# Patient Record
Sex: FEMALE | Race: BLACK OR AFRICAN AMERICAN | NOT HISPANIC OR LATINO | Employment: FULL TIME | URBAN - NONMETROPOLITAN AREA
[De-identification: names, ages, dates, MRNs, and addresses within clinical notes are randomized per-mention and may not be internally consistent; named-entity substitution may affect disease eponyms.]

---

## 2017-01-25 ENCOUNTER — TRANSCRIBE ORDERS (OUTPATIENT)
Dept: CARDIAC SURGERY | Facility: CLINIC | Age: 37
End: 2017-01-25

## 2017-01-25 DIAGNOSIS — I10 ESSENTIAL HYPERTENSION, MALIGNANT: Primary | ICD-10-CM

## 2017-03-09 RX ORDER — METHIMAZOLE 5 MG/1
5 TABLET ORAL DAILY
Qty: 30 TABLET | Refills: 0 | Status: SHIPPED | OUTPATIENT
Start: 2017-03-09 | End: 2017-05-31 | Stop reason: SDUPTHER

## 2017-05-19 DIAGNOSIS — F41.9 ANXIETY: ICD-10-CM

## 2017-05-19 DIAGNOSIS — F32.A DEPRESSIVE DISORDER: ICD-10-CM

## 2017-05-19 RX ORDER — ESCITALOPRAM OXALATE 10 MG/1
TABLET ORAL
Qty: 30 TABLET | Refills: 0 | Status: SHIPPED | OUTPATIENT
Start: 2017-05-19 | End: 2017-05-31 | Stop reason: SDUPTHER

## 2017-05-31 ENCOUNTER — OFFICE VISIT (OUTPATIENT)
Dept: FAMILY MEDICINE CLINIC | Facility: CLINIC | Age: 37
End: 2017-05-31

## 2017-05-31 VITALS
SYSTOLIC BLOOD PRESSURE: 116 MMHG | WEIGHT: 233.4 LBS | HEART RATE: 92 BPM | OXYGEN SATURATION: 96 % | DIASTOLIC BLOOD PRESSURE: 70 MMHG | TEMPERATURE: 98.4 F | RESPIRATION RATE: 20 BRPM | HEIGHT: 67 IN | BODY MASS INDEX: 36.63 KG/M2

## 2017-05-31 DIAGNOSIS — Z13.1 SCREENING FOR DIABETES MELLITUS: ICD-10-CM

## 2017-05-31 DIAGNOSIS — F41.9 ANXIETY: ICD-10-CM

## 2017-05-31 DIAGNOSIS — E05.90 HYPERTHYROIDISM: Chronic | ICD-10-CM

## 2017-05-31 DIAGNOSIS — R12 HEARTBURN: ICD-10-CM

## 2017-05-31 DIAGNOSIS — E55.9 VITAMIN D DEFICIENCY: Chronic | ICD-10-CM

## 2017-05-31 DIAGNOSIS — F32.A DEPRESSIVE DISORDER: ICD-10-CM

## 2017-05-31 DIAGNOSIS — I10 ESSENTIAL HYPERTENSION: Primary | Chronic | ICD-10-CM

## 2017-05-31 DIAGNOSIS — B37.89 CANDIDIASIS OF BREAST: ICD-10-CM

## 2017-05-31 PROCEDURE — 99214 OFFICE O/P EST MOD 30 MIN: CPT | Performed by: NURSE PRACTITIONER

## 2017-05-31 RX ORDER — TRIAMTERENE AND HYDROCHLOROTHIAZIDE 75; 50 MG/1; MG/1
1 TABLET ORAL DAILY
Qty: 30 TABLET | Refills: 5 | Status: SHIPPED | OUTPATIENT
Start: 2017-05-31 | End: 2018-06-20 | Stop reason: SDUPTHER

## 2017-05-31 RX ORDER — METHIMAZOLE 5 MG/1
5 TABLET ORAL DAILY
Qty: 30 TABLET | Refills: 5 | Status: SHIPPED | OUTPATIENT
Start: 2017-05-31 | End: 2018-06-20 | Stop reason: SDUPTHER

## 2017-05-31 RX ORDER — ERGOCALCIFEROL 1.25 MG/1
50000 CAPSULE ORAL
Qty: 12 CAPSULE | Refills: 3 | Status: SHIPPED | OUTPATIENT
Start: 2017-05-31 | End: 2017-07-18 | Stop reason: ALTCHOICE

## 2017-05-31 RX ORDER — OMEPRAZOLE 20 MG/1
20 CAPSULE, DELAYED RELEASE ORAL DAILY
Qty: 30 CAPSULE | Refills: 5 | Status: SHIPPED | OUTPATIENT
Start: 2017-05-31 | End: 2018-06-20 | Stop reason: SDUPTHER

## 2017-05-31 RX ORDER — NYSTATIN 100000 [USP'U]/G
POWDER TOPICAL 3 TIMES DAILY
Qty: 56.7 G | Refills: 5 | Status: SHIPPED | OUTPATIENT
Start: 2017-05-31 | End: 2018-06-20

## 2017-05-31 RX ORDER — FERROUS SULFATE TAB EC 324 MG (65 MG FE EQUIVALENT) 324 (65 FE) MG
324 TABLET DELAYED RESPONSE ORAL
Qty: 30 TABLET | Refills: 5 | Status: SHIPPED | OUTPATIENT
Start: 2017-05-31 | End: 2018-06-20 | Stop reason: SDDI

## 2017-05-31 RX ORDER — ESCITALOPRAM OXALATE 10 MG/1
10 TABLET ORAL DAILY
Qty: 30 TABLET | Refills: 5 | Status: SHIPPED | OUTPATIENT
Start: 2017-05-31 | End: 2018-06-20 | Stop reason: DRUGHIGH

## 2017-07-17 LAB
25(OH)D3 SERPL-MCNC: 28.5 NG/ML (ref 30–100)
ARTICHOKE IGE QN: 98 MG/DL (ref 1–129)
CHOLEST SERPL-MCNC: 157 MG/DL (ref 0–199)
HBA1C MFR BLD: 6.24 % (ref 4–5.6)
HDLC SERPL-MCNC: 43 MG/DL (ref 60–200)
LDLC/HDLC SERPL: 1.94 {RATIO} (ref 0–3.22)
T4 FREE SERPL-MCNC: 0.73 NG/DL (ref 0.78–2.19)
TRIGL SERPL-MCNC: 152 MG/DL (ref 20–199)
TSH SERPL DL<=0.05 MIU/L-ACNC: 1.14 MIU/ML (ref 0.46–4.68)

## 2017-07-17 PROCEDURE — 36415 COLL VENOUS BLD VENIPUNCTURE: CPT | Performed by: NURSE PRACTITIONER

## 2017-07-17 PROCEDURE — 82306 VITAMIN D 25 HYDROXY: CPT | Performed by: NURSE PRACTITIONER

## 2017-07-17 PROCEDURE — 85027 COMPLETE CBC AUTOMATED: CPT | Performed by: NURSE PRACTITIONER

## 2017-07-17 PROCEDURE — 84439 ASSAY OF FREE THYROXINE: CPT | Performed by: NURSE PRACTITIONER

## 2017-07-17 PROCEDURE — 84443 ASSAY THYROID STIM HORMONE: CPT | Performed by: NURSE PRACTITIONER

## 2017-07-17 PROCEDURE — 80061 LIPID PANEL: CPT | Performed by: INTERNAL MEDICINE

## 2017-07-17 PROCEDURE — 84481 FREE ASSAY (FT-3): CPT | Performed by: NURSE PRACTITIONER

## 2017-07-17 PROCEDURE — 83036 HEMOGLOBIN GLYCOSYLATED A1C: CPT | Performed by: NURSE PRACTITIONER

## 2017-07-17 PROCEDURE — 80053 COMPREHEN METABOLIC PANEL: CPT | Performed by: INTERNAL MEDICINE

## 2017-07-18 ENCOUNTER — TELEPHONE (OUTPATIENT)
Dept: FAMILY MEDICINE CLINIC | Facility: CLINIC | Age: 37
End: 2017-07-18

## 2017-07-18 DIAGNOSIS — R73.09 ELEVATED HEMOGLOBIN A1C: Primary | ICD-10-CM

## 2017-07-18 LAB
DEPRECATED RDW RBC AUTO: 44.9 FL (ref 36.4–46.3)
ERYTHROCYTE [DISTWIDTH] IN BLOOD BY AUTOMATED COUNT: 14.3 % (ref 11.5–14.5)
HCT VFR BLD AUTO: 37.6 % (ref 35–45)
HGB BLD-MCNC: 12.1 G/DL (ref 12–15.5)
MCH RBC QN AUTO: 27.8 PG (ref 26.5–34)
MCHC RBC AUTO-ENTMCNC: 32.2 G/DL (ref 31.4–36)
MCV RBC AUTO: 86.4 FL (ref 80–98)
PLATELET # BLD AUTO: 206 10*3/MM3 (ref 150–450)
PMV BLD AUTO: 12.4 FL (ref 8–12)
RBC # BLD AUTO: 4.35 10*6/MM3 (ref 3.77–5.16)
T3FREE SERPL-MCNC: 2.6 PG/ML (ref 2–4.4)
WBC NRBC COR # BLD: 5.92 10*3/MM3 (ref 3.2–9.8)

## 2017-07-18 PROCEDURE — 82043 UR ALBUMIN QUANTITATIVE: CPT | Performed by: INTERNAL MEDICINE

## 2017-07-18 PROCEDURE — 82570 ASSAY OF URINE CREATININE: CPT | Performed by: INTERNAL MEDICINE

## 2017-07-18 RX ORDER — METFORMIN HYDROCHLORIDE 500 MG/1
500 TABLET, EXTENDED RELEASE ORAL
Qty: 30 TABLET | Refills: 5 | Status: SHIPPED | OUTPATIENT
Start: 2017-07-18 | End: 2018-06-20 | Stop reason: SDDI

## 2017-07-18 NOTE — TELEPHONE ENCOUNTER
Insurance likely will not cover the daily vitamin D.  Advise her to take vitamin D3 OTC daily instead.  I will send the metformin in.  Thank you!

## 2017-07-18 NOTE — TELEPHONE ENCOUNTER
Spoke with patient and she would like to start the metformin.  She wanted to know if you could send in a prescription for a daily Vitamin D instead of the weekly because she forgets to take it.

## 2017-07-18 NOTE — TELEPHONE ENCOUNTER
----- Message from ROSSY Matthews sent at 7/18/2017  8:20 AM CDT -----  Vitamin D is still a little low.  Has she been taking her vitamin D supplement?  A1C continues to be elevated at 6.24.  Is she interested in metformin to help manage her blood sugars and lower her A1C?

## 2017-07-19 LAB
ALBUMIN UR-MCNC: <0.6 MG/L
CREAT UR-MCNC: 242.4 MG/DL
MICROALBUMIN/CREAT UR: NORMAL MG/G (ref 0–30)

## 2018-02-07 DIAGNOSIS — R73.09 ELEVATED HEMOGLOBIN A1C: ICD-10-CM

## 2018-02-07 RX ORDER — METFORMIN HYDROCHLORIDE 500 MG/1
TABLET, EXTENDED RELEASE ORAL
Qty: 30 TABLET | Refills: 5 | OUTPATIENT
Start: 2018-02-07

## 2018-05-29 DIAGNOSIS — E05.90 HYPERTHYROIDISM: Chronic | ICD-10-CM

## 2018-05-29 RX ORDER — METHIMAZOLE 5 MG/1
TABLET ORAL
Qty: 30 TABLET | Refills: 5 | OUTPATIENT
Start: 2018-05-29

## 2018-05-31 DIAGNOSIS — I10 ESSENTIAL HYPERTENSION: Chronic | ICD-10-CM

## 2018-05-31 RX ORDER — TRIAMTERENE AND HYDROCHLOROTHIAZIDE 75; 50 MG/1; MG/1
TABLET ORAL
Qty: 30 TABLET | Refills: 5 | OUTPATIENT
Start: 2018-05-31

## 2018-06-20 ENCOUNTER — OFFICE VISIT (OUTPATIENT)
Dept: FAMILY MEDICINE CLINIC | Facility: CLINIC | Age: 38
End: 2018-06-20

## 2018-06-20 VITALS
OXYGEN SATURATION: 97 % | RESPIRATION RATE: 20 BRPM | WEIGHT: 259.6 LBS | DIASTOLIC BLOOD PRESSURE: 80 MMHG | TEMPERATURE: 98.3 F | HEIGHT: 68 IN | SYSTOLIC BLOOD PRESSURE: 130 MMHG | HEART RATE: 90 BPM | BODY MASS INDEX: 39.34 KG/M2

## 2018-06-20 DIAGNOSIS — Z13.220 SCREENING FOR LIPOID DISORDERS: ICD-10-CM

## 2018-06-20 DIAGNOSIS — I10 ESSENTIAL HYPERTENSION: Chronic | ICD-10-CM

## 2018-06-20 DIAGNOSIS — R12 HEARTBURN: ICD-10-CM

## 2018-06-20 DIAGNOSIS — E05.90 HYPERTHYROIDISM: Primary | Chronic | ICD-10-CM

## 2018-06-20 DIAGNOSIS — M25.562 ACUTE PAIN OF LEFT KNEE: ICD-10-CM

## 2018-06-20 DIAGNOSIS — E16.1 HYPERINSULINISM: ICD-10-CM

## 2018-06-20 DIAGNOSIS — F41.9 ANXIETY: ICD-10-CM

## 2018-06-20 DIAGNOSIS — Z12.31 ENCOUNTER FOR SCREENING MAMMOGRAM FOR BREAST CANCER: ICD-10-CM

## 2018-06-20 DIAGNOSIS — Z13.1 SCREENING FOR DIABETES MELLITUS: ICD-10-CM

## 2018-06-20 DIAGNOSIS — E55.9 VITAMIN D DEFICIENCY: Chronic | ICD-10-CM

## 2018-06-20 DIAGNOSIS — R07.2 PRECORDIAL PAIN: ICD-10-CM

## 2018-06-20 PROCEDURE — 99214 OFFICE O/P EST MOD 30 MIN: CPT | Performed by: NURSE PRACTITIONER

## 2018-06-20 RX ORDER — CLOBETASOL PROPIONATE 0.5 MG/G
OINTMENT TOPICAL EVERY 12 HOURS SCHEDULED
Qty: 60 G | Refills: 5 | Status: SHIPPED | OUTPATIENT
Start: 2018-06-20 | End: 2018-06-29 | Stop reason: CLARIF

## 2018-06-20 RX ORDER — TRIAMTERENE AND HYDROCHLOROTHIAZIDE 75; 50 MG/1; MG/1
1 TABLET ORAL DAILY
Qty: 30 TABLET | Refills: 5 | Status: SHIPPED | OUTPATIENT
Start: 2018-06-20 | End: 2018-10-26 | Stop reason: SDUPTHER

## 2018-06-20 RX ORDER — METHIMAZOLE 5 MG/1
5 TABLET ORAL DAILY
Qty: 30 TABLET | Refills: 0 | Status: SHIPPED | OUTPATIENT
Start: 2018-06-20 | End: 2018-07-28 | Stop reason: SDUPTHER

## 2018-06-20 RX ORDER — NAPROXEN 500 MG/1
500 TABLET ORAL 2 TIMES DAILY WITH MEALS
Qty: 60 TABLET | Refills: 0 | Status: SHIPPED | OUTPATIENT
Start: 2018-06-20 | End: 2018-07-23 | Stop reason: SDUPTHER

## 2018-06-20 RX ORDER — ESCITALOPRAM OXALATE 20 MG/1
20 TABLET ORAL DAILY
Qty: 30 TABLET | Refills: 5 | Status: SHIPPED | OUTPATIENT
Start: 2018-06-20 | End: 2018-10-26 | Stop reason: SDUPTHER

## 2018-06-20 RX ORDER — OMEPRAZOLE 20 MG/1
20 CAPSULE, DELAYED RELEASE ORAL DAILY
Qty: 30 CAPSULE | Refills: 5 | Status: SHIPPED | OUTPATIENT
Start: 2018-06-20 | End: 2018-10-26 | Stop reason: SDUPTHER

## 2018-06-20 NOTE — PROGRESS NOTES
She has some fluid on the left knee as I stated in the office, but there are also bone spurs present.  Would she like a referral to ortho to discuss this and possible treatment options?

## 2018-06-20 NOTE — PATIENT INSTRUCTIONS
Preventive Care 18-39 Years, Female  Preventive care refers to lifestyle choices and visits with your health care provider that can promote health and wellness.  What does preventive care include?  · A yearly physical exam. This is also called an annual well check.  · Dental exams once or twice a year.  · Routine eye exams. Ask your health care provider how often you should have your eyes checked.  · Personal lifestyle choices, including:  ? Daily care of your teeth and gums.  ? Regular physical activity.  ? Eating a healthy diet.  ? Avoiding tobacco and drug use.  ? Limiting alcohol use.  ? Practicing safe sex.  ? Taking vitamin and mineral supplements as recommended by your health care provider.  What happens during an annual well check?  The services and screenings done by your health care provider during your annual well check will depend on your age, overall health, lifestyle risk factors, and family history of disease.  Counseling  Your health care provider may ask you questions about your:  · Alcohol use.  · Tobacco use.  · Drug use.  · Emotional well-being.  · Home and relationship well-being.  · Sexual activity.  · Eating habits.  · Work and work environment.  · Method of birth control.  · Menstrual cycle.  · Pregnancy history.    Screening  You may have the following tests or measurements:  · Height, weight, and BMI.  · Diabetes screening. This is done by checking your blood sugar (glucose) after you have not eaten for a while (fasting).  · Blood pressure.  · Lipid and cholesterol levels. These may be checked every 5 years starting at age 20.  · Skin check.  · Hepatitis C blood test.  · Hepatitis B blood test.  · Sexually transmitted disease (STD) testing.  · BRCA-related cancer screening. This may be done if you have a family history of breast, ovarian, tubal, or peritoneal cancers.  · Pelvic exam and Pap test. This may be done every 3 years starting at age 21. Starting at age 30, this may be done every 5  years if you have a Pap test in combination with an HPV test.    Discuss your test results, treatment options, and if necessary, the need for more tests with your health care provider.  Vaccines  Your health care provider may recommend certain vaccines, such as:  · Influenza vaccine. This is recommended every year.  · Tetanus, diphtheria, and acellular pertussis (Tdap, Td) vaccine. You may need a Td booster every 10 years.  · Varicella vaccine. You may need this if you have not been vaccinated.  · HPV vaccine. If you are 26 or younger, you may need three doses over 6 months.  · Measles, mumps, and rubella (MMR) vaccine. You may need at least one dose of MMR. You may also need a second dose.  · Pneumococcal 13-valent conjugate (PCV13) vaccine. You may need this if you have certain conditions and were not previously vaccinated.  · Pneumococcal polysaccharide (PPSV23) vaccine. You may need one or two doses if you smoke cigarettes or if you have certain conditions.  · Meningococcal vaccine. One dose is recommended if you are age 19-21 years and a first-year college student living in a residence [place], or if you have one of several medical conditions. You may also need additional booster doses.  · Hepatitis A vaccine. You may need this if you have certain conditions or if you travel or work in places where you may be exposed to hepatitis A.  · Hepatitis B vaccine. You may need this if you have certain conditions or if you travel or work in places where you may be exposed to hepatitis B.  · Haemophilus influenzae type b (Hib) vaccine. You may need this if you have certain risk factors.    Talk to your health care provider about which screenings and vaccines you need and how often you need them.  This information is not intended to replace advice given to you by your health care provider. Make sure you discuss any questions you have with your health care provider.  Document Released: 02/13/2003 Document Revised: 09/06/2017  Document Reviewed: 10/18/2016  [employer] Patient Education © 2018 [employer]    Preventive Care 40-64 Years, Female  Preventive care refers to lifestyle choices and visits with your health care provider that can promote health and wellness.  What does preventive care include?  · A yearly physical exam. This is also called an annual well check.  · Dental exams once or twice a year.  · Routine eye exams. Ask your health care provider how often you should have your eyes checked.  · Personal lifestyle choices, including:  ? Daily care of your teeth and gums.  ? Regular physical activity.  ? Eating a healthy diet.  ? Avoiding tobacco and drug use.  ? Limiting alcohol use.  ? Practicing safe sex.  ? Taking low-dose aspirin daily starting at age 50.  ? Taking vitamin and mineral supplements as recommended by your health care provider.  What happens during an annual well check?  The services and screenings done by your health care provider during your annual well check will depend on your age, overall health, lifestyle risk factors, and family history of disease.  Counseling  Your health care provider may ask you questions about your:  · Alcohol use.  · Tobacco use.  · Drug use.  · Emotional well-being.  · Home and relationship well-being.  · Sexual activity.  · Eating habits.  · Work and work environment.  · Method of birth control.  · Menstrual cycle.  · Pregnancy history.    Screening  You may have the following tests or measurements:  · Height, weight, and BMI.  · Blood pressure.  · Lipid and cholesterol levels. These may be checked every 5 years, or more frequently if you are over 50 years old.  · Skin check.  · Lung cancer screening. You may have this screening every year starting at age 55 if you have a 30-pack-year history of smoking and currently smoke or have quit within the past 15 years.  · Fecal occult blood test (FOBT) of the stool. You may have this test every year starting at age  50.  · Flexible sigmoidoscopy or colonoscopy. You may have a sigmoidoscopy every 5 years or a colonoscopy every 10 years starting at age 50.  · Hepatitis C blood test.  · Hepatitis B blood test.  · Sexually transmitted disease (STD) testing.  · Diabetes screening. This is done by checking your blood sugar (glucose) after you have not eaten for a while (fasting). You may have this done every 1-3 years.  · Mammogram. This may be done every 1-2 years. Talk to your health care provider about when you should start having regular mammograms. This may depend on whether you have a family history of breast cancer.  · BRCA-related cancer screening. This may be done if you have a family history of breast, ovarian, tubal, or peritoneal cancers.  · Pelvic exam and Pap test. This may be done every 3 years starting at age 21. Starting at age 30, this may be done every 5 years if you have a Pap test in combination with an HPV test.  · Bone density scan. This is done to screen for osteoporosis. You may have this scan if you are at high risk for osteoporosis.    Discuss your test results, treatment options, and if necessary, the need for more tests with your health care provider.  Vaccines  Your health care provider may recommend certain vaccines, such as:  · Influenza vaccine. This is recommended every year.  · Tetanus, diphtheria, and acellular pertussis (Tdap, Td) vaccine. You may need a Td booster every 10 years.  · Varicella vaccine. You may need this if you have not been vaccinated.  · Zoster vaccine. You may need this after age 60.  · Measles, mumps, and rubella (MMR) vaccine. You may need at least one dose of MMR if you were born in 1957 or later. You may also need a second dose.  · Pneumococcal 13-valent conjugate (PCV13) vaccine. You may need this if you have certain conditions and were not previously vaccinated.  · Pneumococcal polysaccharide (PPSV23) vaccine. You may need one or two doses if you smoke cigarettes or if you  have certain conditions.  · Meningococcal vaccine. You may need this if you have certain conditions.  · Hepatitis A vaccine. You may need this if you have certain conditions or if you travel or work in places where you may be exposed to hepatitis A.  · Hepatitis B vaccine. You may need this if you have certain conditions or if you travel or work in places where you may be exposed to hepatitis B.  · Haemophilus influenzae type b (Hib) vaccine. You may need this if you have certain conditions.    Talk to your health care provider about which screenings and vaccines you need and how often you need them.  This information is not intended to replace advice given to you by your health care provider. Make sure you discuss any questions you have with your health care provider.  Document Released: 01/13/2017 Document Revised: 09/06/2017 Document Reviewed: 10/18/2016  [employer] Interactive Patient Education © 2018 [employer]    Generalized Anxiety Disorder, Adult  Generalized anxiety disorder (GREGORIA) is a mental health disorder. People with this condition constantly worry about everyday events. Unlike normal anxiety, worry related to GREGORIA is not triggered by a specific event. These worries also do not fade or get better with time. GREGORIA interferes with life functions, including relationships, work, and school.  GREGORIA can vary from mild to severe. People with severe GREGORIA can have intense waves of anxiety with physical symptoms (panic attacks).  What are the causes?  The exact cause of GREGORIA is not known.  What increases the risk?  This condition is more likely to develop in:  · Women.  · People who have a family history of anxiety disorders.  · People who are very shy.  · People who experience very stressful life events, such as the death of a loved one.  · People who have a very stressful family environment.    What are the signs or symptoms?  People with GREGORIA often worry excessively about many things in their lives, such as their  health and family. They may also be overly concerned about:  · Doing well at work.  · Being on time.  · Natural disasters.  · Friendships.    Physical symptoms of GREGORIA include:  · Fatigue.  · Muscle tension or having muscle twitches.  · Trembling or feeling shaky.  · Being easily startled.  · Feeling like your heart is pounding or racing.  · Feeling out of breath or like you cannot take a deep breath.  · Having trouble falling asleep or staying asleep.  · Sweating.  · Nausea, diarrhea, or irritable bowel syndrome (IBS).  · Headaches.  · Trouble concentrating or remembering facts.  · Restlessness.  · Irritability.    How is this diagnosed?  Your health care provider can diagnose GREGORIA based on your symptoms and medical history. You will also have a physical exam. The health care provider will ask specific questions about your symptoms, including how severe they are, when they started, and if they come and go. Your health care provider may ask you about your use of alcohol or drugs, including prescription medicines. Your health care provider may refer you to a mental health specialist for further evaluation.  Your health care provider will do a thorough examination and may perform additional tests to rule out other possible causes of your symptoms.  To be diagnosed with GREGORIA, a person must have anxiety that:  · Is out of his or her control.  · Affects several different aspects of his or her life, such as work and relationships.  · Causes distress that makes him or her unable to take part in normal activities.  · Includes at least three physical symptoms of GREGORIA, such as restlessness, fatigue, trouble concentrating, irritability, muscle tension, or sleep problems.    Before your health care provider can confirm a diagnosis of GREGORIA, these symptoms must be present more days than they are not, and they must last for six months or longer.  How is this treated?  The following therapies are usually used to treat GREGORIA:  · Medicine.  Antidepressant medicine is usually prescribed for long-term daily control. Antianxiety medicines may be added in severe cases, especially when panic attacks occur.  · Talk therapy (psychotherapy). Certain types of talk therapy can be helpful in treating GREGORIA by providing support, education, and guidance. Options include:  ? Cognitive behavioral therapy (CBT). People learn coping skills and techniques to ease their anxiety. They learn to identify unrealistic or negative thoughts and behaviors and to replace them with positive ones.  ? Acceptance and commitment therapy (ACT). This treatment teaches people how to be mindful as a way to cope with unwanted thoughts and feelings.  ? Biofeedback. This process trains you to manage your body's response (physiological response) through breathing techniques and relaxation methods. You will work with a therapist while machines are used to monitor your physical symptoms.  · Stress management techniques. These include yoga, meditation, and exercise.    A mental health specialist can help determine which treatment is best for you. Some people see improvement with one type of therapy. However, other people require a combination of therapies.  Follow these instructions at home:  · Take over-the-counter and prescription medicines only as told by your health care provider.  · Try to maintain a normal routine.  · Try to anticipate stressful situations and allow extra time to manage them.  · Practice any stress management or self-calming techniques as taught by your health care provider.  · Do not punish yourself for setbacks or for not making progress.  · Try to recognize your accomplishments, even if they are small.  · Keep all follow-up visits as told by your health care provider. This is important.  Contact a health care provider if:  · Your symptoms do not get better.  · Your symptoms get worse.  · You have signs of depression, such as:  ? A persistently sad, cranky, or irritable  mood.  ? Loss of enjoyment in activities that used to bring you anna.  ? Change in weight or eating.  ? Changes in sleeping habits.  ? Avoiding friends or family members.  ? Loss of energy for normal tasks.  ? Feelings of guilt or worthlessness.  Get help right away if:  · You have serious thoughts about hurting yourself or others.  If you ever feel like you may hurt yourself or others, or have thoughts about taking your own life, get help right away. You can go to your nearest emergency department or call:  · Your local emergency services (911 in the [place]  · A suicide crisis helpline, such as the National Suicide Prevention Lifeline at 1-311.154.6847. This is open 24 hours a day.    Summary  · Generalized anxiety disorder (GREGORIA) is a mental health disorder that involves worry that is not triggered by a specific event.  · People with GREGORIA often worry excessively about many things in their lives, such as their health and family.  · GREGORIA may cause physical symptoms such as restlessness, trouble concentrating, sleep problems, frequent sweating, nausea, diarrhea, headaches, and trembling or muscle twitching.  · A mental health specialist can help determine which treatment is best for you. Some people see improvement with one type of therapy. However, other people require a combination of therapies.  This information is not intended to replace advice given to you by your health care provider. Make sure you discuss any questions you have with your health care provider.  Document Released: 04/14/2014 Document Revised: 11/07/2017 Document Reviewed: 11/07/2017  [email or web address] Interactive Patient Education © 2018 [employer]

## 2018-06-21 ENCOUNTER — TELEPHONE (OUTPATIENT)
Dept: FAMILY MEDICINE CLINIC | Facility: CLINIC | Age: 38
End: 2018-06-21

## 2018-06-21 DIAGNOSIS — M25.562 CHRONIC PAIN OF LEFT KNEE: Primary | ICD-10-CM

## 2018-06-21 DIAGNOSIS — G89.29 CHRONIC PAIN OF LEFT KNEE: Primary | ICD-10-CM

## 2018-06-21 NOTE — TELEPHONE ENCOUNTER
----- Message from ROSSY Matthews sent at 6/20/2018  3:35 PM CDT -----  She has some fluid on the left knee as I stated in the office, but there are also bone spurs present.  Would she like a referral to ortho to discuss this and possible treatment options?

## 2018-06-22 LAB
25(OH)D3 SERPL-MCNC: 23.3 NG/ML (ref 30–100)
ALBUMIN SERPL-MCNC: 4.1 G/DL (ref 3.4–4.8)
ALBUMIN/GLOB SERPL: 1.2 G/DL (ref 1.1–1.8)
ALP SERPL-CCNC: 93 U/L (ref 38–126)
ALT SERPL W P-5'-P-CCNC: 35 U/L (ref 9–52)
ANION GAP SERPL CALCULATED.3IONS-SCNC: 10 MMOL/L (ref 5–15)
ARTICHOKE IGE QN: 107 MG/DL (ref 1–129)
AST SERPL-CCNC: 27 U/L (ref 14–36)
BILIRUB SERPL-MCNC: 0.7 MG/DL (ref 0.2–1.3)
BUN BLD-MCNC: 19 MG/DL (ref 7–21)
BUN/CREAT SERPL: 19.8 (ref 7–25)
CALCIUM SPEC-SCNC: 9.7 MG/DL (ref 8.4–10.2)
CHLORIDE SERPL-SCNC: 103 MMOL/L (ref 95–110)
CHOLEST SERPL-MCNC: 167 MG/DL (ref 0–199)
CO2 SERPL-SCNC: 27 MMOL/L (ref 22–31)
CREAT BLD-MCNC: 0.96 MG/DL (ref 0.5–1)
DEPRECATED RDW RBC AUTO: 44.3 FL (ref 36.4–46.3)
ERYTHROCYTE [DISTWIDTH] IN BLOOD BY AUTOMATED COUNT: 14.2 % (ref 11.5–14.5)
GFR SERPL CREATININE-BSD FRML MDRD: 78 ML/MIN/1.73 (ref 64–149)
GLOBULIN UR ELPH-MCNC: 3.3 GM/DL (ref 2.3–3.5)
GLUCOSE BLD-MCNC: 124 MG/DL (ref 60–100)
HBA1C MFR BLD: 6.8 % (ref 4–5.6)
HCT VFR BLD AUTO: 38.8 % (ref 35–45)
HDLC SERPL-MCNC: 34 MG/DL (ref 60–200)
HGB BLD-MCNC: 12.7 G/DL (ref 12–15.5)
LDLC/HDLC SERPL: 2.97 {RATIO} (ref 0–3.22)
MCH RBC QN AUTO: 27.9 PG (ref 26.5–34)
MCHC RBC AUTO-ENTMCNC: 32.7 G/DL (ref 31.4–36)
MCV RBC AUTO: 85.3 FL (ref 80–98)
PLATELET # BLD AUTO: 233 10*3/MM3 (ref 150–450)
PMV BLD AUTO: 10.9 FL (ref 8–12)
POTASSIUM BLD-SCNC: 4.1 MMOL/L (ref 3.5–5.1)
PROT SERPL-MCNC: 7.4 G/DL (ref 6.3–8.6)
RBC # BLD AUTO: 4.55 10*6/MM3 (ref 3.77–5.16)
SODIUM BLD-SCNC: 140 MMOL/L (ref 137–145)
T4 FREE SERPL-MCNC: 0.72 NG/DL (ref 0.78–2.19)
TRIGL SERPL-MCNC: 160 MG/DL (ref 20–199)
TSH SERPL DL<=0.05 MIU/L-ACNC: 0.52 MIU/ML (ref 0.46–4.68)
WBC NRBC COR # BLD: 5.85 10*3/MM3 (ref 3.2–9.8)

## 2018-06-22 PROCEDURE — 80053 COMPREHEN METABOLIC PANEL: CPT | Performed by: NURSE PRACTITIONER

## 2018-06-22 PROCEDURE — 80061 LIPID PANEL: CPT | Performed by: NURSE PRACTITIONER

## 2018-06-22 PROCEDURE — 84443 ASSAY THYROID STIM HORMONE: CPT | Performed by: NURSE PRACTITIONER

## 2018-06-22 PROCEDURE — 85027 COMPLETE CBC AUTOMATED: CPT | Performed by: NURSE PRACTITIONER

## 2018-06-22 PROCEDURE — 83525 ASSAY OF INSULIN: CPT | Performed by: NURSE PRACTITIONER

## 2018-06-22 PROCEDURE — 36415 COLL VENOUS BLD VENIPUNCTURE: CPT | Performed by: NURSE PRACTITIONER

## 2018-06-22 PROCEDURE — 84439 ASSAY OF FREE THYROXINE: CPT | Performed by: NURSE PRACTITIONER

## 2018-06-22 PROCEDURE — 82306 VITAMIN D 25 HYDROXY: CPT | Performed by: NURSE PRACTITIONER

## 2018-06-22 PROCEDURE — 83036 HEMOGLOBIN GLYCOSYLATED A1C: CPT | Performed by: NURSE PRACTITIONER

## 2018-06-22 PROCEDURE — 84481 FREE ASSAY (FT-3): CPT | Performed by: NURSE PRACTITIONER

## 2018-06-24 LAB — T3FREE SERPL-MCNC: 2.9 PG/ML (ref 2–4.4)

## 2018-06-25 LAB — INSULIN SERPL-ACNC: 39.9 UIU/ML (ref 2.6–24.9)

## 2018-06-27 NOTE — PROGRESS NOTES
Subjective   Connie Lopez is a 37 y.o. female.     She presents today for her routine follow up on chronic medical problems.  It has been over a year since we last saw her.  She does need refills on all her routine daily medications.  She reports that she would like to increase her anxiety medication because she doesn't feel like it is fully effective for her symptoms.  She also notes that she has been suffering from left knee pain and swelling.  No known injury to the left knee.   She does suffer from some precordial pain.  She was previously seeing Dr. Oliveira when he came to [place], but he is no longer coming to the clinic so she would like a referral to see Dr. Araujo here in [place] if possible.      Knee Pain    The incident occurred more than 1 week ago. There was no injury mechanism. The pain is present in the left knee. The pain is mild. The pain has been constant since onset. Pertinent negatives include no inability to bear weight, loss of motion, loss of sensation, muscle weakness, numbness or tingling. She reports no foreign bodies present. The symptoms are aggravated by weight bearing and movement. She has tried nothing for the symptoms. The treatment provided no relief.   Thyroid Problem   Presents for follow-up visit. Symptoms include anxiety, depressed mood and weight gain. Patient reports no cold intolerance, constipation, diaphoresis, diarrhea, fatigue, hair loss, heat intolerance, hoarse voice, leg swelling, menstrual problem, nail problem, palpitations, tremors, visual change or weight loss. The symptoms have been stable.        The following portions of the patient's history were reviewed and updated as appropriate: allergies, current medications, past family history, past medical history, past social history, past surgical history and problem list.    Review of Systems   Constitutional: Positive for unexpected weight gain. Negative for diaphoresis, fatigue and  unexpected weight loss.   HENT: Negative.  Negative for hoarse voice.    Eyes: Negative.    Respiratory: Negative.    Cardiovascular: Negative.  Negative for palpitations.   Gastrointestinal: Negative.  Negative for constipation and diarrhea.   Endocrine: Negative for cold intolerance and heat intolerance.   Genitourinary: Negative for menstrual problem.   Musculoskeletal: Positive for arthralgias (left knee pain) and joint swelling (left knee swelling).   Skin: Negative.    Allergic/Immunologic: Negative.    Neurological: Negative.  Negative for tingling, tremors and numbness.   Hematological: Negative.    Psychiatric/Behavioral: Positive for decreased concentration, dysphoric mood and depressed mood. The patient is nervous/anxious.        Objective   Physical Exam   Constitutional: She is oriented to person, place, and time. Vital signs are normal. She appears well-developed and well-nourished. No distress.   HENT:   Head: Normocephalic.   Right Ear: External ear normal.   Left Ear: External ear normal.   Nose: Nose normal.   Mouth/Throat: Oropharynx is clear and moist. No oropharyngeal exudate.   Eyes: Conjunctivae and EOM are normal. Pupils are equal, round, and reactive to light. Right eye exhibits no discharge. Left eye exhibits no discharge.   Neck: Normal range of motion. Neck supple. No tracheal deviation present. No thyromegaly present.   Cardiovascular: Normal rate, regular rhythm and normal heart sounds.  Exam reveals no gallop and no friction rub.    No murmur heard.  Pulmonary/Chest: Effort normal and breath sounds normal. No respiratory distress. She has no wheezes. She has no rales. She exhibits no tenderness.   Musculoskeletal: Normal range of motion.        Left knee: She exhibits swelling. Tenderness found.   Lymphadenopathy:     She has no cervical adenopathy.   Neurological: She is alert and oriented to person, place, and time.   Skin: Skin is warm and dry. Capillary refill takes less than 2  seconds. No rash noted. She is not diaphoretic. No erythema. No pallor.   Psychiatric: She has a normal mood and affect. Her behavior is normal. Judgment and thought content normal.   Nursing note and vitals reviewed.        Assessment/Plan   Connie was seen today for annual exam.    Diagnoses and all orders for this visit:    Hyperthyroidism  -     CBC (No Diff)  -     Comprehensive Metabolic Panel  -     T3, Free  -     T4, Free  -     TSH  -     methIMAzole (TAPAZOLE) 5 MG tablet; Take 1 tablet by mouth Daily.    Essential hypertension  -     triamterene-hydrochlorothiazide (MAXZIDE) 75-50 MG per tablet; Take 1 tablet by mouth Daily.    Vitamin D deficiency  -     Vitamin D 25 Hydroxy    Screening for diabetes mellitus  -     Hemoglobin A1c    Screening for lipoid disorders  -     Lipid Panel    Precordial pain  -     Ambulatory Referral to Cardiology    Anxiety  -     escitalopram (LEXAPRO) 20 MG tablet; Take 1 tablet by mouth Daily.    Heartburn  -     omeprazole (priLOSEC) 20 MG capsule; Take 1 capsule by mouth Daily.    Encounter for screening mammogram for breast cancer  -     Mammo Screening Bilateral With CAD; Future    Acute pain of left knee  -     XR knee 4+ vw left  -     naproxen (NAPROSYN) 500 MG tablet; Take 1 tablet by mouth 2 (Two) Times a Day With Meals.    Hyperinsulinism  -     Insulin, Total    Other orders  -     clobetasol (TEMOVATE) 0.05 % ointment; Apply  topically Every 12 (Twelve) Hours.    Patient's Body mass index is 39.47 kg/m². BMI is above normal parameters. Recommendations include: educational material, exercise counseling and nutrition counseling.    Fasting labs.  Referral to cardiology for chest pain symptoms.  X-ray following office visit.  Naproxen BID for left knee pain.  Schedule for mammogram in September when she turns 38.  Increase Lexapro dosage to 20 mg once daily.  Continue all other current medications.  Follow up in 6 months for routine follow up.  Follow up sooner  for problems/concerns.  Patient verbalized understanding and agreement with plan of care.        This document has been electronically signed by ROSSY Matthews on June 27, 2018 7:16 AM

## 2018-06-28 NOTE — PROGRESS NOTES
A1C is 6.8 which is diagnostic of diabetes.  She needs to monitor her diet very closely and avoid fatty and sugary foods to avoid needing medication to control her blood sugars.  Her vitamin D is also a little lower than I'd like it.  Has she been taking her vitamin D supplement?  She will need to make an appt to see me in 3 months so that we can repeat her labs to be sure that her A1C has remained the same or lowered in order to avoid needing to start on diabetic medications.

## 2018-06-29 RX ORDER — HALOBETASOL PROPIONATE 0.05 %
OINTMENT (GRAM) TOPICAL EVERY 12 HOURS SCHEDULED
Qty: 50 G | Refills: 5 | Status: SHIPPED | OUTPATIENT
Start: 2018-06-29 | End: 2019-01-22

## 2018-07-02 ENCOUNTER — TELEPHONE (OUTPATIENT)
Dept: FAMILY MEDICINE CLINIC | Facility: CLINIC | Age: 38
End: 2018-07-02

## 2018-07-02 NOTE — TELEPHONE ENCOUNTER
She needs to take an OTC vitamin D supplement. Please let the referral coordinator know about the change in referral.

## 2018-07-02 NOTE — TELEPHONE ENCOUNTER
----- Message from ROSSY Matthews sent at 6/28/2018  5:27 PM CDT -----  A1C is 6.8 which is diagnostic of diabetes.  She needs to monitor her diet very closely and avoid fatty and sugary foods to avoid needing medication to control her blood sugars.  Her vitamin D is also a little lower than I'd like it.  Has she been ta  suma her vitamin D supplement?  She will need to make an appt to see me in 3 months so that we can repeat her labs to be sure that her A1C has remained the same or lowered in order to avoid needing to start on diabetic medications.

## 2018-07-23 DIAGNOSIS — M25.562 ACUTE PAIN OF LEFT KNEE: ICD-10-CM

## 2018-07-23 RX ORDER — NAPROXEN 500 MG/1
500 TABLET ORAL 2 TIMES DAILY WITH MEALS
Qty: 60 TABLET | Refills: 5 | Status: SHIPPED | OUTPATIENT
Start: 2018-07-23 | End: 2018-10-26

## 2018-07-28 DIAGNOSIS — E05.90 HYPERTHYROIDISM: Chronic | ICD-10-CM

## 2018-07-31 RX ORDER — METHIMAZOLE 5 MG/1
TABLET ORAL
Qty: 30 TABLET | Refills: 1 | Status: SHIPPED | OUTPATIENT
Start: 2018-07-31 | End: 2018-10-26 | Stop reason: SDUPTHER

## 2018-10-26 ENCOUNTER — OFFICE VISIT (OUTPATIENT)
Dept: FAMILY MEDICINE CLINIC | Facility: CLINIC | Age: 38
End: 2018-10-26

## 2018-10-26 ENCOUNTER — TELEPHONE (OUTPATIENT)
Dept: FAMILY MEDICINE CLINIC | Facility: CLINIC | Age: 38
End: 2018-10-26

## 2018-10-26 ENCOUNTER — APPOINTMENT (OUTPATIENT)
Dept: LAB | Facility: HOSPITAL | Age: 38
End: 2018-10-26

## 2018-10-26 VITALS
OXYGEN SATURATION: 95 % | SYSTOLIC BLOOD PRESSURE: 132 MMHG | WEIGHT: 252.6 LBS | HEIGHT: 68 IN | BODY MASS INDEX: 38.28 KG/M2 | HEART RATE: 70 BPM | RESPIRATION RATE: 20 BRPM | TEMPERATURE: 98 F | DIASTOLIC BLOOD PRESSURE: 80 MMHG

## 2018-10-26 DIAGNOSIS — E11.8 TYPE 2 DIABETES MELLITUS WITH COMPLICATION, WITHOUT LONG-TERM CURRENT USE OF INSULIN (HCC): ICD-10-CM

## 2018-10-26 DIAGNOSIS — F41.9 ANXIETY: Primary | ICD-10-CM

## 2018-10-26 DIAGNOSIS — I10 ESSENTIAL HYPERTENSION: Chronic | ICD-10-CM

## 2018-10-26 DIAGNOSIS — E55.9 VITAMIN D DEFICIENCY: ICD-10-CM

## 2018-10-26 DIAGNOSIS — E05.90 HYPERTHYROIDISM: Chronic | ICD-10-CM

## 2018-10-26 DIAGNOSIS — G89.29 CHRONIC PAIN OF LEFT KNEE: ICD-10-CM

## 2018-10-26 DIAGNOSIS — M25.562 CHRONIC PAIN OF LEFT KNEE: ICD-10-CM

## 2018-10-26 DIAGNOSIS — R12 HEARTBURN: ICD-10-CM

## 2018-10-26 LAB
25(OH)D3 SERPL-MCNC: 38.2 NG/ML (ref 30–100)
ALBUMIN SERPL-MCNC: 4.2 G/DL (ref 3.4–4.8)
ALBUMIN/GLOB SERPL: 1.2 G/DL (ref 1.1–1.8)
ALP SERPL-CCNC: 104 U/L (ref 38–126)
ALT SERPL W P-5'-P-CCNC: 28 U/L (ref 9–52)
ANION GAP SERPL CALCULATED.3IONS-SCNC: 10 MMOL/L (ref 5–15)
AST SERPL-CCNC: 24 U/L (ref 14–36)
BILIRUB SERPL-MCNC: 0.7 MG/DL (ref 0.2–1.3)
BUN BLD-MCNC: 16 MG/DL (ref 7–21)
BUN/CREAT SERPL: 14.5 (ref 7–25)
CALCIUM SPEC-SCNC: 9.8 MG/DL (ref 8.4–10.2)
CHLORIDE SERPL-SCNC: 99 MMOL/L (ref 95–110)
CO2 SERPL-SCNC: 28 MMOL/L (ref 22–31)
CREAT BLD-MCNC: 1.1 MG/DL (ref 0.5–1)
GFR SERPL CREATININE-BSD FRML MDRD: 67 ML/MIN/1.73 (ref 58–135)
GLOBULIN UR ELPH-MCNC: 3.6 GM/DL (ref 2.3–3.5)
GLUCOSE BLD-MCNC: 124 MG/DL (ref 60–100)
HBA1C MFR BLD: 6.6 % (ref 4–5.6)
POTASSIUM BLD-SCNC: 3.8 MMOL/L (ref 3.5–5.1)
PROT SERPL-MCNC: 7.8 G/DL (ref 6.3–8.6)
SODIUM BLD-SCNC: 137 MMOL/L (ref 137–145)
TSH SERPL DL<=0.05 MIU/L-ACNC: 0.62 MIU/ML (ref 0.46–4.68)

## 2018-10-26 PROCEDURE — 80053 COMPREHEN METABOLIC PANEL: CPT | Performed by: NURSE PRACTITIONER

## 2018-10-26 PROCEDURE — 82306 VITAMIN D 25 HYDROXY: CPT | Performed by: NURSE PRACTITIONER

## 2018-10-26 PROCEDURE — 83036 HEMOGLOBIN GLYCOSYLATED A1C: CPT | Performed by: NURSE PRACTITIONER

## 2018-10-26 PROCEDURE — 84443 ASSAY THYROID STIM HORMONE: CPT | Performed by: NURSE PRACTITIONER

## 2018-10-26 PROCEDURE — 99214 OFFICE O/P EST MOD 30 MIN: CPT | Performed by: NURSE PRACTITIONER

## 2018-10-26 RX ORDER — ESCITALOPRAM OXALATE 20 MG/1
20 TABLET ORAL DAILY
Qty: 30 TABLET | Refills: 5 | Status: SHIPPED | OUTPATIENT
Start: 2018-10-26 | End: 2019-07-30 | Stop reason: SDUPTHER

## 2018-10-26 RX ORDER — TRIAMTERENE AND HYDROCHLOROTHIAZIDE 75; 50 MG/1; MG/1
1 TABLET ORAL DAILY
Qty: 30 TABLET | Refills: 5 | Status: SHIPPED | OUTPATIENT
Start: 2018-10-26 | End: 2019-10-29 | Stop reason: SDUPTHER

## 2018-10-26 RX ORDER — METHIMAZOLE 5 MG/1
5 TABLET ORAL DAILY
Qty: 30 TABLET | Refills: 5 | Status: SHIPPED | OUTPATIENT
Start: 2018-10-26 | End: 2019-08-26 | Stop reason: SDUPTHER

## 2018-10-26 RX ORDER — BUPROPION HYDROCHLORIDE 150 MG/1
150 TABLET ORAL EVERY MORNING
Qty: 30 TABLET | Refills: 5 | Status: SHIPPED | OUTPATIENT
Start: 2018-10-26 | End: 2019-02-05 | Stop reason: DRUGHIGH

## 2018-10-26 RX ORDER — MELOXICAM 15 MG/1
15 TABLET ORAL DAILY
Qty: 30 TABLET | Refills: 5 | Status: SHIPPED | OUTPATIENT
Start: 2018-10-26 | End: 2019-01-22

## 2018-10-26 RX ORDER — OMEPRAZOLE 20 MG/1
20 CAPSULE, DELAYED RELEASE ORAL DAILY
Qty: 30 CAPSULE | Refills: 5 | Status: SHIPPED | OUTPATIENT
Start: 2018-10-26 | End: 2018-12-14 | Stop reason: DRUGHIGH

## 2018-10-26 NOTE — TELEPHONE ENCOUNTER
----- Message from ROSSY Matthews sent at 10/26/2018  1:17 PM CDT -----  A1C is improving.  It is down to 6.6.  Continue dietary modifications that she has been making.

## 2018-11-13 NOTE — PROGRESS NOTES
Subjective   Connie Lopez is a 38 y.o. female.     She presents today for her routine follow up on chronic medical problems.  It has been about 4 months since we last saw her.  She does need refills on some of her routine daily medications.  She reports that she would like to change or increase her anxiety and depression medication because she doesn't feel like it is fully effective for her symptoms.  She also notes that she has still been suffering from left knee pain and swelling.  No known injury to the left knee.  She would like a referral to orthopedics regarding this if possible. She is doing well on her current thyroid medication without and side effects.  She is due for some repeat labs today in the office.  She is otherwise without any new complaints today in the office.       Knee Pain    The incident occurred more than 1 week ago. There was no injury mechanism. The pain is present in the left knee. The pain is mild. The pain has been constant since onset. Pertinent negatives include no inability to bear weight, loss of motion, loss of sensation, muscle weakness, numbness or tingling. She reports no foreign bodies present. The symptoms are aggravated by weight bearing and movement. She has tried nothing for the symptoms. The treatment provided no relief.   Thyroid Problem   Presents for follow-up visit. Symptoms include anxiety and depressed mood. Patient reports no cold intolerance, constipation, diaphoresis, diarrhea, fatigue, hair loss, heat intolerance, hoarse voice, leg swelling, menstrual problem, nail problem, palpitations, tremors, visual change, weight gain or weight loss. The symptoms have been stable.   Anxiety   Presents for follow-up visit. Symptoms include decreased concentration, depressed mood, excessive worry, irritability, nervous/anxious behavior, panic and restlessness. Patient reports no chest pain, compulsions, confusion, dizziness, dry mouth, feeling of choking,  hyperventilation, impotence, insomnia, malaise, muscle tension, nausea, obsessions, palpitations, shortness of breath or suicidal ideas. Symptoms occur occasionally. The severity of symptoms is moderate. The quality of sleep is fair.     Her past medical history is significant for depression. Compliance with medications is %.   Depression   Visit Type: follow-up  Patient presents with the following symptoms: decreased concentration, depressed mood, excessive worry, irritability, nervousness/anxiety, panic and restlessness.  Patient is not experiencing: anhedonia, chest pain, choking sensation, compulsions, confusion, dizziness, dry mouth, fatigue, feelings of hopelessness, feelings of worthlessness, hypersomnia, hyperventilation, impotence, insomnia, malaise, memory impairment, muscle tension, nausea, obsessions, palpitations, psychomotor agitation, psychomotor retardation, shortness of breath, suicidal ideas, suicidal planning, thoughts of death, weight gain and weight loss.  Frequency of symptoms: most days   Severity: moderate   Sleep quality: fair  Compliance with medications:  %             The following portions of the patient's history were reviewed and updated as appropriate: allergies, current medications, past family history, past medical history, past social history, past surgical history and problem list.    Review of Systems   Constitutional: Positive for irritability. Negative for diaphoresis, fatigue, unexpected weight gain and unexpected weight loss.   HENT: Negative.  Negative for hoarse voice.    Eyes: Negative.    Respiratory: Negative.  Negative for choking and shortness of breath.    Cardiovascular: Negative.  Negative for chest pain and palpitations.   Gastrointestinal: Negative.  Negative for constipation, diarrhea and nausea.   Endocrine: Negative for cold intolerance and heat intolerance.   Genitourinary: Negative for impotence and menstrual problem.   Musculoskeletal: Positive  for arthralgias and joint swelling.   Skin: Negative.    Allergic/Immunologic: Negative.    Neurological: Negative.  Negative for dizziness, tingling, tremors, numbness and confusion.   Hematological: Negative.    Psychiatric/Behavioral: Positive for decreased concentration, dysphoric mood, depressed mood and stress. Negative for suicidal ideas. The patient is nervous/anxious. The patient does not have insomnia.        Objective   Physical Exam   Constitutional: She is oriented to person, place, and time. Vital signs are normal. She appears well-developed and well-nourished. No distress. She is obese.  HENT:   Head: Normocephalic.   Right Ear: External ear normal.   Left Ear: External ear normal.   Nose: Nose normal.   Mouth/Throat: Oropharynx is clear and moist. No oropharyngeal exudate.   Eyes: Conjunctivae and EOM are normal. Pupils are equal, round, and reactive to light. Right eye exhibits no discharge. Left eye exhibits no discharge.   Neck: Normal range of motion. Neck supple. No tracheal deviation present. No thyromegaly present.   Cardiovascular: Normal rate, regular rhythm and normal heart sounds. Exam reveals no gallop and no friction rub.   No murmur heard.  Pulmonary/Chest: Effort normal and breath sounds normal. No respiratory distress. She has no wheezes. She has no rales. She exhibits no tenderness.   Musculoskeletal:        Left knee: She exhibits decreased range of motion. Tenderness found.   Lymphadenopathy:     She has no cervical adenopathy.   Neurological: She is alert and oriented to person, place, and time.   Skin: Skin is warm and dry. Capillary refill takes less than 2 seconds. No rash noted. She is not diaphoretic. No erythema. No pallor.   Psychiatric: She has a normal mood and affect. Her behavior is normal. Judgment and thought content normal.   Nursing note and vitals reviewed.        Assessment/Plan   Connie was seen today for follow-up.    Diagnoses and all orders for this  visit:    Anxiety  -     buPROPion XL (WELLBUTRIN XL) 150 MG 24 hr tablet; Take 1 tablet by mouth Every Morning.  -     escitalopram (LEXAPRO) 20 MG tablet; Take 1 tablet by mouth Daily.    Chronic pain of left knee  -     Ambulatory Referral to Orthopedic Surgery  -     meloxicam (MOBIC) 15 MG tablet; Take 1 tablet by mouth Daily.    Hyperthyroidism  -     methIMAzole (TAPAZOLE) 5 MG tablet; Take 1 tablet by mouth Daily.  -     TSH    Heartburn  -     omeprazole (priLOSEC) 20 MG capsule; Take 1 capsule by mouth Daily.    Essential hypertension  -     triamterene-hydrochlorothiazide (MAXZIDE) 75-50 MG per tablet; Take 1 tablet by mouth Daily.    Type 2 diabetes mellitus with complication, without long-term current use of insulin (CMS/[facility])  -     Comprehensive Metabolic Panel  -     Hemoglobin A1c    Vitamin D deficiency  -     Vitamin D 25 Hydroxy    Patient's Body mass index is 38.41 kg/m². BMI is above normal parameters. Recommendations include: educational material, exercise counseling and nutrition counseling.  Lab following office visit.   Add Wellbutrin once daily to current Lexapro regimen.  Referral to ortho for chronic left knee pain.  Continue current medications.  Follow up in 3 months for routine follow up.  Follow up sooner for problems/concerns.  Patient verbalized understanding and agreement with plan of care.        This document has been electronically signed by ROSSY Matthews on November 12, 2018 10:18 PM

## 2018-11-20 ENCOUNTER — TELEPHONE (OUTPATIENT)
Dept: FAMILY MEDICINE CLINIC | Facility: CLINIC | Age: 38
End: 2018-11-20

## 2018-11-20 NOTE — TELEPHONE ENCOUNTER
Normal mammogram.  Repeat in 1 year.        This document has been electronically signed by ROSSY Matthews on November 20, 2018 3:52 PM

## 2018-11-21 DIAGNOSIS — Z12.31 ENCOUNTER FOR SCREENING MAMMOGRAM FOR BREAST CANCER: ICD-10-CM

## 2018-12-04 ENCOUNTER — TELEPHONE (OUTPATIENT)
Dept: FAMILY MEDICINE CLINIC | Facility: CLINIC | Age: 38
End: 2018-12-04

## 2018-12-04 NOTE — TELEPHONE ENCOUNTER
Patient left a voice message stating that she had been seen in a walk in clinic and needed to follow up.  I called patient to find out where she had been see so I could request records.  No answer left voice message.

## 2018-12-10 DIAGNOSIS — M25.562 LEFT KNEE PAIN, UNSPECIFIED CHRONICITY: Primary | ICD-10-CM

## 2018-12-14 ENCOUNTER — OFFICE VISIT (OUTPATIENT)
Dept: FAMILY MEDICINE CLINIC | Facility: CLINIC | Age: 38
End: 2018-12-14

## 2018-12-14 ENCOUNTER — APPOINTMENT (OUTPATIENT)
Dept: LAB | Facility: HOSPITAL | Age: 38
End: 2018-12-14

## 2018-12-14 VITALS
WEIGHT: 246.9 LBS | OXYGEN SATURATION: 96 % | SYSTOLIC BLOOD PRESSURE: 126 MMHG | BODY MASS INDEX: 37.42 KG/M2 | RESPIRATION RATE: 20 BRPM | TEMPERATURE: 98.3 F | HEART RATE: 79 BPM | HEIGHT: 68 IN | DIASTOLIC BLOOD PRESSURE: 80 MMHG

## 2018-12-14 DIAGNOSIS — K58.2 IRRITABLE BOWEL SYNDROME WITH BOTH CONSTIPATION AND DIARRHEA: ICD-10-CM

## 2018-12-14 DIAGNOSIS — N76.0 BV (BACTERIAL VAGINOSIS): ICD-10-CM

## 2018-12-14 DIAGNOSIS — E05.90 HYPERTHYROIDISM: Chronic | ICD-10-CM

## 2018-12-14 DIAGNOSIS — G89.29 CHRONIC ABDOMINAL PAIN: Primary | ICD-10-CM

## 2018-12-14 DIAGNOSIS — B96.89 BV (BACTERIAL VAGINOSIS): ICD-10-CM

## 2018-12-14 DIAGNOSIS — I10 ESSENTIAL HYPERTENSION: Chronic | ICD-10-CM

## 2018-12-14 DIAGNOSIS — R10.9 CHRONIC ABDOMINAL PAIN: Primary | ICD-10-CM

## 2018-12-14 LAB
ALBUMIN SERPL-MCNC: 4.6 G/DL (ref 3.4–4.8)
ALBUMIN/GLOB SERPL: 1.4 G/DL (ref 1.1–1.8)
ALP SERPL-CCNC: 101 U/L (ref 38–126)
ALT SERPL W P-5'-P-CCNC: 29 U/L (ref 9–52)
ANION GAP SERPL CALCULATED.3IONS-SCNC: 12 MMOL/L (ref 5–15)
AST SERPL-CCNC: 20 U/L (ref 14–36)
BILIRUB SERPL-MCNC: 0.8 MG/DL (ref 0.2–1.3)
BUN BLD-MCNC: 14 MG/DL (ref 7–21)
BUN/CREAT SERPL: 12.1 (ref 7–25)
CALCIUM SPEC-SCNC: 10.1 MG/DL (ref 8.4–10.2)
CHLORIDE SERPL-SCNC: 97 MMOL/L (ref 95–110)
CO2 SERPL-SCNC: 30 MMOL/L (ref 22–31)
CREAT BLD-MCNC: 1.16 MG/DL (ref 0.5–1)
DEPRECATED RDW RBC AUTO: 41.9 FL (ref 36.4–46.3)
ERYTHROCYTE [DISTWIDTH] IN BLOOD BY AUTOMATED COUNT: 13.7 % (ref 11.5–14.5)
GFR SERPL CREATININE-BSD FRML MDRD: 63 ML/MIN/1.73 (ref 58–135)
GLOBULIN UR ELPH-MCNC: 3.4 GM/DL (ref 2.3–3.5)
GLUCOSE BLD-MCNC: 112 MG/DL (ref 60–100)
HCT VFR BLD AUTO: 39.9 % (ref 35–45)
HGB BLD-MCNC: 13.1 G/DL (ref 12–15.5)
MCH RBC QN AUTO: 27.6 PG (ref 26.5–34)
MCHC RBC AUTO-ENTMCNC: 32.8 G/DL (ref 31.4–36)
MCV RBC AUTO: 84 FL (ref 80–98)
PLATELET # BLD AUTO: 250 10*3/MM3 (ref 150–450)
PMV BLD AUTO: 11.5 FL (ref 8–12)
POTASSIUM BLD-SCNC: 3.9 MMOL/L (ref 3.5–5.1)
PROT SERPL-MCNC: 8 G/DL (ref 6.3–8.6)
RBC # BLD AUTO: 4.75 10*6/MM3 (ref 3.77–5.16)
SODIUM BLD-SCNC: 139 MMOL/L (ref 137–145)
TSH SERPL DL<=0.05 MIU/L-ACNC: 0.64 MIU/ML (ref 0.46–4.68)
WBC NRBC COR # BLD: 5.71 10*3/MM3 (ref 3.2–9.8)

## 2018-12-14 PROCEDURE — 86003 ALLG SPEC IGE CRUDE XTRC EA: CPT | Performed by: NURSE PRACTITIONER

## 2018-12-14 PROCEDURE — 99214 OFFICE O/P EST MOD 30 MIN: CPT | Performed by: NURSE PRACTITIONER

## 2018-12-14 PROCEDURE — 83516 IMMUNOASSAY NONANTIBODY: CPT | Performed by: NURSE PRACTITIONER

## 2018-12-14 PROCEDURE — 85027 COMPLETE CBC AUTOMATED: CPT | Performed by: NURSE PRACTITIONER

## 2018-12-14 PROCEDURE — 80053 COMPREHEN METABOLIC PANEL: CPT | Performed by: NURSE PRACTITIONER

## 2018-12-14 PROCEDURE — 84443 ASSAY THYROID STIM HORMONE: CPT | Performed by: NURSE PRACTITIONER

## 2018-12-14 RX ORDER — METRONIDAZOLE 500 MG/1
500 TABLET ORAL 2 TIMES DAILY
Qty: 14 TABLET | Refills: 0 | Status: SHIPPED | OUTPATIENT
Start: 2018-12-14 | End: 2018-12-21

## 2018-12-14 RX ORDER — OMEPRAZOLE 40 MG/1
40 CAPSULE, DELAYED RELEASE ORAL DAILY
Qty: 30 CAPSULE | Refills: 5 | Status: SHIPPED | OUTPATIENT
Start: 2018-12-14 | End: 2019-10-29 | Stop reason: SDUPTHER

## 2018-12-14 NOTE — PATIENT INSTRUCTIONS
Diet for Irritable Bowel Syndrome  When you have irritable bowel syndrome (IBS), the foods you eat and your eating habits are very important. IBS may cause various symptoms, such as abdominal pain, constipation, or diarrhea. Choosing the right foods can help ease discomfort caused by these symptoms. Work with your health care provider and dietitian to find the best eating plan to help control your symptoms.  What general guidelines do I need to follow?  · Keep a food diary. This will help you identify foods that cause symptoms. Write down:  ? What you eat and when.  ? What symptoms you have.  ? When symptoms occur in relation to your meals.  · Avoid foods that cause symptoms. Talk with your dietitian about other ways to get the same nutrients that are in these foods.  · Eat more foods that contain fiber. Take a fiber supplement if directed by your dietitian.  · Eat your meals slowly, in a relaxed setting.  · Aim to eat 5-6 small meals per day. Do not skip meals.  · Drink enough fluids to keep your urine clear or pale yellow.  · Ask your health care provider if you should take an over-the-counter probiotic during flare-ups to help restore healthy gut bacteria.  · If you have cramping or diarrhea, try making your meals low in fat and high in carbohydrates. Examples of carbohydrates are pasta, rice, whole grain breads and cereals, fruits, and vegetables.  · If dairy products cause your symptoms to flare up, try eating less of them. You might be able to handle yogurt better than other dairy products because it contains bacteria that help with digestion.  What foods are not recommended?  The following are some foods and drinks that may worsen your symptoms:  · Fatty foods, such as French fries.  · Milk products, such as cheese or ice cream.  · Chocolate.  · Alcohol.  · Products with caffeine, such as coffee.  · Carbonated drinks, such as soda.    The items listed above may not be a complete list of foods and beverages to  avoid. Contact your dietitian for more information.  What foods are good sources of fiber?  Your health care provider or dietitian may recommend that you eat more foods that contain fiber. Fiber can help reduce constipation and other IBS symptoms. Add foods with fiber to your diet a little at a time so that your body can get used to them. Too much fiber at once might cause gas and swelling of your abdomen. The following are some foods that are good sources of fiber:  · Apples.  · Peaches.  · Pears.  · Berries.  · Figs.  · Broccoli (raw).  · Cabbage.  · Carrots.  · Raw peas.  · Kidney beans.  · Lima beans.  · Whole grain bread.  · Whole grain cereal.    Where to find more information:  International Foundation for Functional Gastrointestinal Disorders: www.iffgd.org  National Thomas of Diabetes and Digestive and Kidney Diseases: www.niddk.nih.gov/health-information/health-topics/digestive-diseases/ibs/Pages/facts.aspx  This information is not intended to replace advice given to you by your health care provider. Make sure you discuss any questions you have with your health care provider.  Document Released: 03/09/2005 Document Revised: 05/25/2017 Document Reviewed: 03/20/2015  [employer] Interactive Patient Education © 2018 [employer]  Irritable Bowel Syndrome, Adult  Irritable bowel syndrome (IBS) is not one specific disease. It is a group of symptoms that affects the organs responsible for digestion (gastrointestinal or GI tract).  To regulate how your GI tract works, your body sends signals back and forth between your intestines and your brain. If you have IBS, there may be a problem with these signals. As a result, your GI tract does not function normally. Your intestines may become more sensitive and overreact to certain things. This is especially true when you eat certain foods or when you are under stress.  There are four types of IBS. These may be determined based on the consistency of your stool:  · IBS  with diarrhea.  · IBS with constipation.  · Mixed IBS.  · Unsubtyped IBS.    It is important to know which type of IBS you have. Some treatments are more likely to be helpful for certain types of IBS.  What are the causes?  The exact cause of IBS is not known.  What increases the risk?  You may have a higher risk of IBS if:  · You are a woman.  · You are younger than 45 years old.  · You have a family history of IBS.  · You have mental health problems.  · You have had bacterial infection of your GI tract.    What are the signs or symptoms?  Symptoms of IBS vary from person to person. The main symptom is abdominal pain or discomfort. Additional symptoms usually include one or more of the following:  · Diarrhea, constipation, or both.  · Abdominal swelling or bloating.  · Feeling full or sick after eating a small or regular-size meal.  · Frequent gas.  · Mucus in the stool.  · A feeling of having more stool left after a bowel movement.    Symptoms tend to come and go. They may be associated with stress, psychiatric conditions, or nothing at all.  How is this diagnosed?  There is no specific test to diagnose IBS. Your health care provider will make a diagnosis based on a physical exam, medical history, and your symptoms. You may have other tests to rule out other conditions that may be causing your symptoms. These may include:  · Blood tests.  · X-rays.  · CT scan.  · Endoscopy and colonoscopy. This is a test in which your GI tract is viewed with a long, thin, flexible tube.    How is this treated?  There is no cure for IBS, but treatment can help relieve symptoms. IBS treatment often includes:  · Changes to your diet, such as:  ? Eating more fiber.  ? Avoiding foods that cause symptoms.  ? Drinking more water.  ? Eating regular, medium-sized portioned meals.  · Medicines. These may include:  ? Fiber supplements if you have constipation.  ? Medicine to control diarrhea (antidiarrheal medicines).  ? Medicine to help  control muscle spasms in your GI tract (antispasmodic medicines).  ? Medicines to help with any mental health issues, such as antidepressants or tranquilizers.  · Therapy.  ? Talk therapy may help with anxiety, depression, or other mental health issues that can make IBS symptoms worse.  · Stress reduction.  ? Managing your stress can help keep symptoms under control.    Follow these instructions at home:  · Take medicines only as directed by your health care provider.  · Eat a healthy diet.  ? Avoid foods and drinks with added sugar.  ? Include more whole grains, fruits, and vegetables gradually into your diet. This may be especially helpful if you have IBS with constipation.  ? Avoid any foods and drinks that make your symptoms worse. These may include dairy products and caffeinated or carbonated drinks.  ? Do not eat large meals.  ? Drink enough fluid to keep your urine clear or pale yellow.  · Exercise regularly. Ask your health care provider for recommendations of good activities for you.  · Keep all follow-up visits as directed by your health care provider. This is important.  Contact a health care provider if:  · You have constant pain.  · You have trouble or pain with swallowing.  · You have worsening diarrhea.  Get help right away if:  · You have severe and worsening abdominal pain.  · You have diarrhea and:  ? You have a rash, stiff neck, or severe headache.  ? You are irritable, sleepy, or difficult to awaken.  ? You are weak, dizzy, or extremely thirsty.  · You have bright red blood in your stool or you have black tarry stools.  · You have unusual abdominal swelling that is painful.  · You vomit continuously.  · You vomit blood (hematemesis).  · You have both abdominal pain and a fever.  This information is not intended to replace advice given to you by your health care provider. Make sure you discuss any questions you have with your health care provider.  Document Released: 12/18/2006 Document Revised:  05/19/2017 Document Reviewed: 09/04/2015  [employer] Interactive Patient Education © 2018 [employer]

## 2018-12-15 LAB
GLIADIN PEPTIDE IGA SER-ACNC: 26 UNITS (ref 0–19)
GLIADIN PEPTIDE IGG SER-ACNC: 3 UNITS (ref 0–19)
TTG IGA SER-ACNC: <2 U/ML (ref 0–3)
TTG IGG SER-ACNC: 4 U/ML (ref 0–5)

## 2018-12-17 ENCOUNTER — TELEPHONE (OUTPATIENT)
Dept: FAMILY MEDICINE CLINIC | Facility: CLINIC | Age: 38
End: 2018-12-17

## 2018-12-17 LAB
CALIF WALNUT POLN IGE QN: <0.1 KU/L
CODFISH IGE QN: <0.1 KU/L
CONV CLASS DESCRIPTION: NORMAL
COW MILK IGE QN: <0.1 KU/L
EGG WHITE IGE QN: <0.1 KU/L
GLUTEN IGE QN: <0.1 KU/L
HAZELNUT IGE QN: <0.1 KU/L
PEANUT IGE QN: <0.1 KU/L
SCALLOP IGE QN: <0.1 KU/L
SESAME SEED IGE: <0.1 KU/L
SHRIMP IGE: <0.1 KU/L
SOYBEAN IGE QN: <0.1 KU/L
WHEAT IGE QN: <0.1 KU/L

## 2018-12-17 NOTE — TELEPHONE ENCOUNTER
----- Message from ROSSY Matthews sent at 12/17/2018  8:56 AM CST -----  No food allergies noted on the food allergen panel.

## 2018-12-31 NOTE — PROGRESS NOTES
Subjective   Connie Lopez is a 38 y.o. female.     She presents today for her routine follow up on chronic medical problems and a follow up from a recent urgent care visit.  Reports that has been having problems with recurrent abdominal pain, fullness, diarrhea, constipation, and nausea.  Reports that [facility] mentioned IBS.  She has seen GI in the past, but it has been quite some time since she last saw them.  She has lost 6 lbs since her last office visit with me.  She is doing well on her thyroid medication and her most recent labs indicated that her dosage was correct.  She is doing well on her medication for anxiety and depression.  She would like to maintain that same dosage.  She is without any other new complaints today in the office.      Abdominal Pain   This is a recurrent problem. The current episode started more than 1 month ago. The problem occurs intermittently. The problem has been waxing and waning. The pain is located in the generalized abdominal region. The quality of the pain is colicky and cramping. The abdominal pain does not radiate. Associated symptoms include arthralgias, constipation, diarrhea and nausea. Pertinent negatives include no anorexia, belching, dysuria, fever, flatus, frequency, headaches, hematochezia, hematuria, melena, myalgias, vomiting or weight loss. The pain is aggravated by eating. The pain is relieved by nothing. She has tried nothing for the symptoms. The treatment provided no relief.        The following portions of the patient's history were reviewed and updated as appropriate: allergies, current medications, past family history, past medical history, past social history, past surgical history and problem list.    Review of Systems   Constitutional: Negative for diaphoresis, fatigue, fever, unexpected weight gain and unexpected weight loss.   HENT: Negative.    Eyes: Negative.    Respiratory: Negative.  Negative for choking and shortness of breath.     Cardiovascular: Negative.  Negative for chest pain and palpitations.   Gastrointestinal: Positive for abdominal distention, abdominal pain, constipation, diarrhea, nausea and indigestion. Negative for anorexia, flatus, hematochezia, melena and vomiting.   Endocrine: Negative for cold intolerance and heat intolerance.   Genitourinary: Negative for dysuria, frequency, hematuria and menstrual problem.   Musculoskeletal: Positive for arthralgias and joint swelling. Negative for myalgias.   Skin: Negative.    Allergic/Immunologic: Negative.    Neurological: Negative.  Negative for dizziness, tremors and numbness.   Hematological: Negative.    Psychiatric/Behavioral: Positive for decreased concentration, dysphoric mood, depressed mood and stress. Negative for suicidal ideas. The patient is nervous/anxious.        Objective   Physical Exam   Constitutional: She is oriented to person, place, and time. Vital signs are normal. She appears well-developed and well-nourished. No distress. She is obese.  HENT:   Head: Normocephalic.   Right Ear: External ear normal.   Left Ear: External ear normal.   Nose: Nose normal.   Mouth/Throat: Oropharynx is clear and moist. No oropharyngeal exudate.   Eyes: Conjunctivae and EOM are normal. Pupils are equal, round, and reactive to light. Right eye exhibits no discharge. Left eye exhibits no discharge.   Neck: Normal range of motion. Neck supple. No tracheal deviation present. No thyromegaly present.   Cardiovascular: Normal rate, regular rhythm and normal heart sounds. Exam reveals no gallop and no friction rub.   No murmur heard.  Pulmonary/Chest: Effort normal and breath sounds normal. No respiratory distress. She has no wheezes. She has no rales. She exhibits no tenderness.   Abdominal: Soft. Bowel sounds are normal. She exhibits no distension and no mass. There is no tenderness. There is no rebound and no guarding. No hernia.   Musculoskeletal: Normal range of motion.    Lymphadenopathy:     She has no cervical adenopathy.   Neurological: She is alert and oriented to person, place, and time.   Skin: Skin is warm and dry. Capillary refill takes less than 2 seconds. No rash noted. She is not diaphoretic. No erythema. No pallor.   Psychiatric: She has a normal mood and affect. Her behavior is normal. Judgment and thought content normal.   Nursing note and vitals reviewed.        Assessment/Plan   Connie was seen today for follow-up and abdominal pain.    Diagnoses and all orders for this visit:    Chronic abdominal pain  -     CBC (No Diff)  -     Comprehensive Metabolic Panel  -     TSH  -     Recurrent Gastrointestinal Distress  -     Ambulatory Referral to Gastroenterology  -     omeprazole (PRILOSEC) 40 MG capsule; Take 1 capsule by mouth Daily.  -     Allergens (12) Foods  -     Glia(IgA / G) & TTG(IgA / G)    Essential hypertension    Hyperthyroidism  -     TSH    Irritable bowel syndrome with both constipation and diarrhea  -     CBC (No Diff)  -     Comprehensive Metabolic Panel  -     TSH  -     Recurrent Gastrointestinal Distress  -     Ambulatory Referral to Gastroenterology  -     Allergens (12) Foods  -     Glia(IgA / G) & TTG(IgA / G)    BV (bacterial vaginosis)  -     metroNIDAZOLE (FLAGYL) 500 MG tablet; Take 1 tablet by mouth 2 (Two) Times a Day for 7 days.    Patient's Body mass index is 37.54 kg/m². BMI is above normal parameters. Recommendations include: educational material, exercise counseling and nutrition counseling.  Plenty of fluids.  Finish all antibiotics.  Lab following office visit.   Referral to GI for recurrent abdominal discomfort.  Continue all other current medications.  Follow up as scheduled for routine follow up.  Follow up sooner for problems/concerns.  Patient verbalized understanding and agreement with plan of care.        This document has been electronically signed by ROSSY Matthews on December 30, 2018 8:30 PM

## 2019-01-22 ENCOUNTER — OFFICE VISIT (OUTPATIENT)
Dept: ORTHOPEDIC SURGERY | Facility: CLINIC | Age: 39
End: 2019-01-22

## 2019-01-22 VITALS — HEIGHT: 67 IN | WEIGHT: 248 LBS | BODY MASS INDEX: 38.92 KG/M2

## 2019-01-22 DIAGNOSIS — M23.92 INTERNAL DERANGEMENT OF LEFT KNEE: Primary | ICD-10-CM

## 2019-01-22 DIAGNOSIS — M23.91 INTERNAL DERANGEMENT OF KNEE JOINT, RIGHT: Primary | ICD-10-CM

## 2019-01-22 DIAGNOSIS — M25.562 LEFT KNEE PAIN, UNSPECIFIED CHRONICITY: ICD-10-CM

## 2019-01-22 PROCEDURE — 99213 OFFICE O/P EST LOW 20 MIN: CPT | Performed by: NURSE PRACTITIONER

## 2019-01-22 RX ORDER — MELOXICAM 15 MG/1
15 TABLET ORAL DAILY
Qty: 30 TABLET | Refills: 1 | Status: SHIPPED | OUTPATIENT
Start: 2019-01-22 | End: 2019-02-21

## 2019-01-22 NOTE — PROGRESS NOTES
Connie Lopez is a 38 y.o. female   Primary provider:  Delilah Malcolm APRN       Chief Complaint   Patient presents with   • Left Knee - Knee Pain       HISTORY OF PRESENT ILLNESS: patient in today for left knee pain that started about 4-5  months ago, no injury. xrays done on 2018, standing xrays done today. Patient took naproxen in the past, caused constipation.     Knee Pain    The incident occurred more than 1 week ago. There was no injury mechanism. The pain is present in the left knee. The quality of the pain is described as aching (grinding. ). The pain has been intermittent since onset. Associated symptoms comments: Clicking, popping, swelling. . She reports no foreign bodies present. Exacerbated by: walking.  She has tried nothing for the symptoms.        CONCURRENT MEDICAL HISTORY:    Past Medical History:   Diagnosis Date   • Adrenal gland hyperfunction (CMS/HCC)    • Alopecia    • Anemia    • Essential hypertension    • Graves' disease    • Heartburn    • Hyperthyroidism    • IFG (impaired fasting glucose)    • Mineral deficiency     iron deficiency   • Vitamin D deficiency        No Known Allergies      Current Outpatient Medications:   •  buPROPion XL (WELLBUTRIN XL) 150 MG 24 hr tablet, Take 1 tablet by mouth Every Morning., Disp: 30 tablet, Rfl: 5  •  escitalopram (LEXAPRO) 20 MG tablet, Take 1 tablet by mouth Daily., Disp: 30 tablet, Rfl: 5  •  methIMAzole (TAPAZOLE) 5 MG tablet, Take 1 tablet by mouth Daily., Disp: 30 tablet, Rfl: 5  •  omeprazole (PRILOSEC) 40 MG capsule, Take 1 capsule by mouth Daily., Disp: 30 capsule, Rfl: 5  •  triamterene-hydrochlorothiazide (MAXZIDE) 75-50 MG per tablet, Take 1 tablet by mouth Daily., Disp: 30 tablet, Rfl: 5  •  meloxicam (MOBIC) 15 MG tablet, Take 1 tablet by mouth Daily for 30 days., Disp: 30 tablet, Rfl: 1    Past Surgical History:   Procedure Laterality Date   •  SECTION      x2   • MOUTH SURGERY     • TOTAL ABDOMINAL HYSTERECTOMY  "WITH SALPINGO OOPHORECTOMY  07/2015       Family History   Problem Relation Age of Onset   • Thyroid disease Mother    • Hypertension Mother    • Diabetes Father    • Hypertension Father    • Lung cancer Maternal Aunt    • Colon cancer Maternal Aunt    • Heart disease Neg Hx        Social History     Socioeconomic History   • Marital status: Single     Spouse name: Not on file   • Number of children: Not on file   • Years of education: Not on file   • Highest education level: Not on file   Social Needs   • Financial resource strain: Not on file   • Food insecurity - worry: Not on file   • Food insecurity - inability: Not on file   • Transportation needs - medical: Not on file   • Transportation needs - non-medical: Not on file   Occupational History   • Not on file   Tobacco Use   • Smoking status: Never Smoker   • Smokeless tobacco: Never Used   Substance and Sexual Activity   • Alcohol use: Yes     Comment: rare   • Drug use: No   • Sexual activity: Not Currently     Birth control/protection: Surgical   Other Topics Concern   • Not on file   Social History Narrative   • Not on file        Review of Systems   Musculoskeletal: Positive for joint swelling.   Psychiatric/Behavioral: Positive for sleep disturbance. The patient is nervous/anxious.    All other systems reviewed and are negative.      PHYSICAL EXAMINATION:       Ht 170.2 cm (67\")   Wt 112 kg (248 lb)   LMP  (LMP Unknown)   BMI 38.84 kg/m²     Physical Exam   Constitutional: She is oriented to person, place, and time. Vital signs are normal. She appears well-developed and well-nourished. She is cooperative.   HENT:   Head: Normocephalic and atraumatic.   Neck: Trachea normal and phonation normal.   Pulmonary/Chest: Effort normal. No respiratory distress.   Abdominal: Soft. Normal appearance. She exhibits no distension.   Musculoskeletal:        Left knee: She exhibits no effusion.   Neurological: She is alert and oriented to person, place, and time. GCS " eye subscore is 4. GCS verbal subscore is 5. GCS motor subscore is 6.   Skin: Skin is warm, dry and intact. Capillary refill takes less than 2 seconds.   Psychiatric: She has a normal mood and affect. Her speech is normal and behavior is normal. Judgment and thought content normal. Cognition and memory are normal.   Vitals reviewed.      GAIT:     [x]  Normal  []  Antalgic    Assistive device: [x]  None  []  Walker     []  Crutches  []  Cane     []  Wheelchair  []  Stretcher    Right Knee Exam   Right knee exam is normal.    Muscle Strength   The patient has normal right knee strength.      Left Knee Exam     Tenderness   The patient is experiencing tenderness in the lateral joint line and medial joint line.    Range of Motion   Extension: normal   Flexion: abnormal     Tests   Abisai:  Medial - positive Lateral - positive    Other   Erythema: absent  Scars: absent  Sensation: normal  Pulse: present  Swelling: mild  Effusion: no effusion present                Four view left knee     HISTORY: Left knee pain. Acute pain.     AP, oblique, tunnel and lateral views obtained.     COMPARISON: None     No fracture or dislocation.  Small patellar, femoral and tibial osteophytes.  Narrowing of the lateral joint space.  Minimal lateral subluxation proximal tibia with respect to the  distal femur.  Small suprapatellar effusion.  No other osseous or articular abnormality.     IMPRESSION:  CONCLUSION:  Small patellar, femoral and tibial osteophytes.  Narrowing of the lateral joint space.  Minimal lateral subluxation proximal tibia with respect to the  distal femur.  Small suprapatellar effusion.     27483     Electronically signed by:  Kemal Hernandez MD  6/20/2018 12:55 PM  CDT Workstation: [employer]  No results found.        ASSESSMENT:    Diagnoses and all orders for this visit:    Internal derangement of left knee  -     Ambulatory Referral to Physical Therapy Evaluate and treat    Left knee pain, unspecified  chronicity  -     Ambulatory Referral to Physical Therapy Evaluate and treat          PLAN  Recommend a course of physical therapy, anti-inflammatory and follow-up in 4-6 weeks for recheck.  If the pain continues then we'll proceed with an MRI.  No Follow-up on file.    Jaylan Hall, APRN

## 2019-01-30 ENCOUNTER — APPOINTMENT (OUTPATIENT)
Dept: PHYSICAL THERAPY | Facility: HOSPITAL | Age: 39
End: 2019-01-30

## 2019-02-05 ENCOUNTER — HOSPITAL ENCOUNTER (OUTPATIENT)
Dept: PHYSICAL THERAPY | Facility: HOSPITAL | Age: 39
Setting detail: THERAPIES SERIES
Discharge: HOME | End: 2019-02-05

## 2019-02-05 ENCOUNTER — APPOINTMENT (OUTPATIENT)
Dept: LAB | Facility: HOSPITAL | Age: 39
End: 2019-02-05

## 2019-02-05 ENCOUNTER — OFFICE VISIT (OUTPATIENT)
Dept: FAMILY MEDICINE CLINIC | Facility: CLINIC | Age: 39
End: 2019-02-05

## 2019-02-05 VITALS
HEIGHT: 67 IN | TEMPERATURE: 98.6 F | OXYGEN SATURATION: 97 % | SYSTOLIC BLOOD PRESSURE: 120 MMHG | BODY MASS INDEX: 38.33 KG/M2 | DIASTOLIC BLOOD PRESSURE: 80 MMHG | RESPIRATION RATE: 20 BRPM | WEIGHT: 244.2 LBS | HEART RATE: 72 BPM

## 2019-02-05 DIAGNOSIS — E55.9 VITAMIN D DEFICIENCY: Chronic | ICD-10-CM

## 2019-02-05 DIAGNOSIS — R73.9 HYPERGLYCEMIA: ICD-10-CM

## 2019-02-05 DIAGNOSIS — I10 ESSENTIAL HYPERTENSION: Chronic | ICD-10-CM

## 2019-02-05 DIAGNOSIS — F41.9 ANXIETY: Primary | ICD-10-CM

## 2019-02-05 DIAGNOSIS — E66.01 MORBIDLY OBESE (HCC): ICD-10-CM

## 2019-02-05 DIAGNOSIS — M23.92 INTERNAL DERANGEMENT OF LEFT KNEE: ICD-10-CM

## 2019-02-05 DIAGNOSIS — M62.830 SPASM OF MUSCLE OF LOWER BACK: ICD-10-CM

## 2019-02-05 DIAGNOSIS — E05.90 HYPERTHYROIDISM: Chronic | ICD-10-CM

## 2019-02-05 DIAGNOSIS — M25.562 LEFT KNEE PAIN, UNSPECIFIED CHRONICITY: Primary | ICD-10-CM

## 2019-02-05 LAB
ALBUMIN SERPL-MCNC: 4.5 G/DL (ref 3.4–4.8)
ALBUMIN/GLOB SERPL: 1.4 G/DL (ref 1.1–1.8)
ALP SERPL-CCNC: 105 U/L (ref 38–126)
ALT SERPL W P-5'-P-CCNC: 24 U/L (ref 9–52)
ANION GAP SERPL CALCULATED.3IONS-SCNC: 8 MMOL/L (ref 5–15)
AST SERPL-CCNC: 21 U/L (ref 14–36)
BASOPHILS # BLD AUTO: 0.03 10*3/MM3 (ref 0–0.2)
BASOPHILS NFR BLD AUTO: 0.4 % (ref 0–2)
BILIRUB SERPL-MCNC: 0.7 MG/DL (ref 0.2–1.3)
BUN BLD-MCNC: 12 MG/DL (ref 7–21)
BUN/CREAT SERPL: 10.6 (ref 7–25)
CALCIUM SPEC-SCNC: 10.3 MG/DL (ref 8.4–10.2)
CHLORIDE SERPL-SCNC: 101 MMOL/L (ref 95–110)
CO2 SERPL-SCNC: 28 MMOL/L (ref 22–31)
CREAT BLD-MCNC: 1.13 MG/DL (ref 0.5–1)
DEPRECATED RDW RBC AUTO: 44.2 FL (ref 36.4–46.3)
EOSINOPHIL # BLD AUTO: 0.18 10*3/MM3 (ref 0–0.7)
EOSINOPHIL NFR BLD AUTO: 2.6 % (ref 0–7)
ERYTHROCYTE [DISTWIDTH] IN BLOOD BY AUTOMATED COUNT: 14.3 % (ref 11.5–14.5)
GFR SERPL CREATININE-BSD FRML MDRD: 65 ML/MIN/1.73 (ref 58–135)
GLOBULIN UR ELPH-MCNC: 3.2 GM/DL (ref 2.3–3.5)
GLUCOSE BLD-MCNC: 106 MG/DL (ref 60–100)
HBA1C MFR BLD: 6.7 % (ref 4–5.6)
HCT VFR BLD AUTO: 40.4 % (ref 35–45)
HGB BLD-MCNC: 13.4 G/DL (ref 12–15.5)
IMM GRANULOCYTES # BLD AUTO: 0.01 10*3/MM3 (ref 0–0.02)
IMM GRANULOCYTES NFR BLD AUTO: 0.1 % (ref 0–0.5)
LYMPHOCYTES # BLD AUTO: 2.97 10*3/MM3 (ref 0.6–4.2)
LYMPHOCYTES NFR BLD AUTO: 43.5 % (ref 10–50)
MCH RBC QN AUTO: 28.2 PG (ref 26.5–34)
MCHC RBC AUTO-ENTMCNC: 33.2 G/DL (ref 31.4–36)
MCV RBC AUTO: 85.1 FL (ref 80–98)
MONOCYTES # BLD AUTO: 0.5 10*3/MM3 (ref 0–0.9)
MONOCYTES NFR BLD AUTO: 7.3 % (ref 0–12)
NEUTROPHILS # BLD AUTO: 3.13 10*3/MM3 (ref 2–8.6)
NEUTROPHILS NFR BLD AUTO: 46.1 % (ref 37–80)
PLATELET # BLD AUTO: 233 10*3/MM3 (ref 150–450)
PMV BLD AUTO: 11.9 FL (ref 8–12)
POTASSIUM BLD-SCNC: 4.5 MMOL/L (ref 3.5–5.1)
PROT SERPL-MCNC: 7.7 G/DL (ref 6.3–8.6)
RBC # BLD AUTO: 4.75 10*6/MM3 (ref 3.77–5.16)
SODIUM BLD-SCNC: 137 MMOL/L (ref 137–145)
TSH SERPL DL<=0.05 MIU/L-ACNC: 0.71 MIU/ML (ref 0.46–4.68)
WBC NRBC COR # BLD: 6.82 10*3/MM3 (ref 3.2–9.8)

## 2019-02-05 PROCEDURE — 85025 COMPLETE CBC W/AUTO DIFF WBC: CPT | Performed by: NURSE PRACTITIONER

## 2019-02-05 PROCEDURE — 83036 HEMOGLOBIN GLYCOSYLATED A1C: CPT | Performed by: NURSE PRACTITIONER

## 2019-02-05 PROCEDURE — 97162 PT EVAL MOD COMPLEX 30 MIN: CPT | Performed by: PHYSICAL THERAPIST

## 2019-02-05 PROCEDURE — 99214 OFFICE O/P EST MOD 30 MIN: CPT | Performed by: NURSE PRACTITIONER

## 2019-02-05 PROCEDURE — 80053 COMPREHEN METABOLIC PANEL: CPT | Performed by: NURSE PRACTITIONER

## 2019-02-05 PROCEDURE — 84443 ASSAY THYROID STIM HORMONE: CPT | Performed by: NURSE PRACTITIONER

## 2019-02-05 RX ORDER — BUPROPION HYDROCHLORIDE 300 MG/1
300 TABLET ORAL DAILY
Qty: 91 TABLET | Refills: 1 | Status: SHIPPED | OUTPATIENT
Start: 2019-02-05 | End: 2019-07-30 | Stop reason: SDUPTHER

## 2019-02-05 NOTE — THERAPY EVALUATION
Outpatient Physical Therapy Ortho Initial Evaluation  [facility]  Acacia Clifton, PT, DPT, CSCS       Patient Name: Connie Lopez  : 1980  MRN: [id number]  Today's Date: 2019      Visit Date: 2019     Pt reports 6/10 pain pre treatment, 6/10 pain post treatment  Reports N/A% of improvement.  Attended  visits.  Insurance available: Pending approval  Next MD appt: TBD .  Recertification: 2019.    Patient Active Problem List   Diagnosis   • Essential hypertension   • Vitamin D deficiency   • Hyperthyroidism   • Left knee pain   • Morbidly obese (CMS/HCC)   • Hyperglycemia   • Anxiety        Past Medical History:   Diagnosis Date   • Adrenal gland hyperfunction (CMS/HCC)    • Alopecia    • Anemia    • Essential hypertension    • Graves' disease    • Heartburn    • Hyperthyroidism    • IFG (impaired fasting glucose)    • Mineral deficiency     iron deficiency   • Vitamin D deficiency         Past Surgical History:   Procedure Laterality Date   •  SECTION      x2   • MOUTH SURGERY     • TOTAL ABDOMINAL HYSTERECTOMY WITH SALPINGO OOPHORECTOMY  2015       Visit Dx:     ICD-10-CM ICD-9-CM   1. Left knee pain, unspecified chronicity M25.562 719.46   2. Internal derangement of left knee M23.92 717.9     Number of days off work: N/A    Patient is single.    Patient has 2 children.    Medications     buPROPion XL (WELLBUTRIN XL) 300 MG 24 hr tablet     escitalopram (LEXAPRO) 20 MG tablet     meloxicam (MOBIC) 15 MG tablet     methIMAzole (TAPAZOLE) 5 MG tablet     omeprazole (PRILOSEC) 40 MG capsule     triamterene-hydrochlorothiazide (MAXZIDE) 75-50 MG per tablet      Allergies: None    Patient History     Row Name 19 0800             History    Chief Complaint  Pain  -AJ      Type of Pain  Knee pain  -AJ      Date Current Problem(s) Began  -- Chronic, <1 year, but a few months  -AJ      Brief Description of Current Complaint  patient  rpeorts no injury. Just started bothering her. SHe reports that it will pop, click, and catch at times. She reports it is painful when it pops.  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  Yes, Ortho  -AJ      What clinical tests have you had for this problem?  X-ray  -AJ      Results of Clinical Tests  Standing AP of both knees reveal lateral compartmental degenerative changes bilaterally with no evidence of fracture or dislocation or other acute radiological abnormality noted.  There are no standing comparison films on file.  -AJ      History of Previous Related Injuries  None  -AJ      Are you or can you be pregnant  No  -AJ         Pain     Pain Location  Knee  -AJ      Pain at Present  6  -AJ      Pain at Best  4  -AJ      Pain at Worst  8  -AJ      Pain Frequency  Constant/continuous  -AJ      Pain Description  Aching;Pressure;Sharp  -AJ      What Performance Factors Make the Current Problem(s) WORSE?  walking  -AJ      What Performance Factors Make the Current Problem(s) BETTER?  rubbing on it  -AJ      Tolerance Time- Standing  ~30 minutes  -AJ      Tolerance Time- Sitting  usually okay  -AJ      Tolerance Time- Walking  <1/4 mile  -AJ      Is your sleep disturbed?  Yes sometimes charley horses  -AJ      Is medication used to assist with sleep?  No  -AJ      Difficulties at work?  squatting  -AJ      Difficulties with ADL's?  dressing  -AJ      Difficulties with recreational activities?  walking, playing with kids  -AJ        User Key  (r) = Recorded By, (t) = Taken By, (c) = Cosigned By     Initials Name Provider Type    Acacia Matute, PT Physical Therapist          PT Ortho     Row Name 02/05/19 0800       Subjective Comments    Subjective Comments  Patient wishes to be able to walk better and have less pain.  -AJ       Precautions and Contraindications    Precautions/Limitations  no known precautions/limitations  -AJ       Subjective Pain    Able to rate subjective pain?  yes  -AJ    Pre-Treatment Pain Level  6  -AJ    Post-Treatment Pain Level  6  -AJ       Posture/Observations    Alignment Options  Genu valgus  -AJ    Genu valgus  Bilateral:;Mild;Moderate;Increased  -AJ    Observations  Edema  -AJ    Posture/Observations Comments  No acute distress.  -AJ       Special Tests/Palpation    Special Tests/Palpation  -- Mild L anterior knee global TTP.  -AJ       Knee Special Tests    Anterior drawer (ACL lesion)  Bilateral:;Negative  -AJ    Lachman’s (ACL lesion)  Bilateral:;Negative  -AJ    Posterior drawer (PCL lesion)  Bilateral:;Negative  -AJ    Posterior sag sign (PCL lesion)  Bilateral:;Negative  -AJ    Valgus stress (MCL lesion)  Bilateral:;Negative  -AJ    Varus stress (LCL lesion)  Bilateral:;Negative  -AJ    Pivot shift test (ACL lesion)  Bilateral:;Negative  -AJ    Apley’s distraction test (meniscal lesion vs OA)  Left:;Positive;Right:;Negative  -AJ    Apley’s compression test (meniscal lesion vs OA)  Left:;Positive;Right:;Negative  -AJ    Abisai’s test (meniscal lesion)  Left:;Positive;Right:;Negative  -AJ    Reverse pivot shift test (posterolateral instability)  Bilateral:;Negative  -AJ    Patellar grind test (chondromalacia patella)  Bilateral:;Positive  -AJ    Patellar ballotment test (joint effusion)  Bilateral:;Negative  -AJ    Patellofemoral apprehension sign (instability)  Bilateral:;Negative  -AJ       Right Lower Ext    Rt Knee Extension/Flexion AROM  10°-0°-105°  -AJ       Left Lower Ext    Lt Knee Extension/Flexion AROM  10°-0°-85°  -AJ       MMT (Manual Muscle  Testing)    General MMT Comments  B LE 5/5, except R Quads 4+/5, L 4/5 with pain and B HS 4/5.  -AJ       Sensation    Sensation WNL?  WNL  -AJ    Light Touch  No apparent deficits  -AJ    Additional Comments  Patient denies any numbnessor tingling.  -AJ       Lower Extremity Flexibility    Hamstrings  Bilateral:;Mildly limited  -AJ    ITB  Bilateral:;Mildly limited;Moderately limited  -AJ       RLE Quick Girth (cm)    Other 1  39.9 cm circum at the knee joint line  -AJ       LLE Quick Girth (cm)    Other 1  42.8 cm circum at the knee joint line  -AJ       Pathomechanics    Lower Extremity Pathomechanics  Pops knee into hyperextension with midstance  -AJ       Transfers    Comment (Transfers)  I with all transfers  -       Gait/Stairs Assessment/Training    Comment (Gait/Stairs)  FWB, non-antalgic gait, does tend to pop knee into hyperextension with stance.  -AJ      User Key  (r) = Recorded By, (t) = Taken By, (c) = Cosigned By    Initials Name Provider Type    Acacia Matute, PT Physical Therapist              Therapy Education  Given: HEP, Symptoms/condition management, Pain management, Posture/body mechanics, Edema management(POC)  Program: New  How Provided: Verbal, Demonstration  Provided to: Patient  Level of Understanding: Verbalized, Demonstrated     PT OP Goals     Row Name 02/05/19 0900          PT Short Term Goals    STG Date to Achieve  02/26/19  -     STG 1  I with HEP and have additions/changes by next recertification.  -     STG 2  Patient able to control hyperextension in B knees.  -AJ     STG 3  AROM L knee >= 100°.  -AJ     STG 4  B Quads 4+/5 or greater.  -     STG 5  B HS 5/5.  -        Long Term Goals    LTG Date to Achieve  03/15/19  -     LTG 1  AROm B knees >= 115°.  -AJ     LTG 2  B LE 5/5.  -     LTG 3  Circum measurements joint effusion witihn 1 cm or R=L. at the knee joint line.  -     LTG 4  Patient able to ambulate up/down 3 steps reciprocally x10, HRA for  balance.  -     LTG 5  Patient able to ambulate 1/2 mile non-antigically with no increase in pain.  -     LTG 6  Patient able to make a full squat and return to standing with no increase in pain.  -     LTG 7  Patient able ot show proper lifting technique floor to waist.  -     LTG 8  I with final HEP.  -     LTG 9  D/C with a final HEP and free 30 day fitness formula memembership.  -        Time Calculation    PT Goal Re-Cert Due Date  02/26/19  -       User Key  (r) = Recorded By, (t) = Taken By, (c) = Cosigned By    Initials Name Provider Type    Acacia Matute, PT Physical Therapist         Barriers to Rehab: Include significant or possible arthritic/degenerative changes that have occurred within the joint, The chronicity of this issue, The patient's obesity.      Safety Issues: None noted.      PT Assessment/Plan     Row Name 02/05/19 0900          PT Assessment    Functional Limitations  Limitation in home management;Limitations in community activities;Performance in leisure activities;Performance in self-care ADL;Performance in work activities  -     Impairments  Balance;Edema;Gait;Impaired flexibility;Impaired muscle endurance;Impaired muscle length;Impaired muscle power;Range of motion;Pain;Muscle strength;Joint mobility;Joint integrity  -     Assessment Comments  Patient's insurance requires authorization prior to treatment beginning. Patient was issues small HEP.  -     Rehab Potential  Fair  -     Patient/caregiver participated in establishment of treatment plan and goals  Yes  -     Patient would benefit from skilled therapy intervention  Yes  -        PT Plan    PT Frequency  2x/week  -     Predicted Duration of Therapy Intervention (Therapy Eval)  3-4 weeks, 6-8 visits  -     Planned CPT's?  PT EVAL MOD COMPLELITY: 26798;PT RE-EVAL: 45387;PT THER PROC EA 15 MIN: 28211;PT THER ACT EA 15 MIN: 02962;PT MANUAL THERAPY EA 15 MIN: 97691;PT ELECTRICAL STIM UNATTEND:  ";PT THER SUPP EA 15 MIN  -     Physical Therapy Interventions (Optional Details)  gross motor skills;home exercise program;joint mobilization;manual therapy techniques;modalities;patient/family education;postural re-education;ROM (Range of Motion);strengthening;stretching;gait training  -     PT Plan Comments  Progress overall ROM, strength, gait, mechanics, and hyperextension control.  -       User Key  (r) = Recorded By, (t) = Taken By, (c) = Cosigned By    Initials Name Provider Type    Aaccia Matute PT Physical Therapist       Other therapeutic activities and/or exercises will be prescribed depending on the patients progress or lack there of.      Exercises     Row Name 02/05/19 0800             Subjective Comments    Subjective Comments  Patient wishes to be able to walk better and have less pain.  -         Subjective Pain    Able to rate subjective pain?  yes  -      Pre-Treatment Pain Level  6  -      Post-Treatment Pain Level  6  -         Exercise 1    Exercise Name 1  QS with hyperextension control.  -      Reps 1  5  -      Time 1  5\" hold  -         Exercise 2    Exercise Name 2  Discussion of not standing in hyperextension.  -        User Key  (r) = Recorded By, (t) = Taken By, (c) = Cosigned By    Initials Name Provider Type    Acacia Matute, ZACHARY Physical Therapist                        Outcome Measure Options: Lower Extremity Functional Scale (LEFS)  Lower Extremity Functional Index  Any of your usual work, housework or school activities: Quite a bit of difficulty  Your usual hobbies, recreational or sporting activities: Quite a bit of difficulty  Getting into or out of the bath: Quite a bit of difficulty  Walking between rooms: Moderate difficulty  Putting on your shoes or socks: A little bit of difficulty  Squatting: Extreme difficulty or unable to perform activity  Lifting an object, like a bag of groceries from the floor: Moderate " difficulty  Performing light activities around your home: Moderate difficulty  Performing heavy activities around your home: Moderate difficulty  Getting into or out of a car: A little bit of difficulty  Walking 2 blocks: Quite a bit of difficulty  Walking a mile: Quite a bit of difficulty  Going up or down 10 stairs (about 1 flight of stairs): Quite a bit of difficulty  Standing for 1 hour: Moderate difficulty  Sitting for 1 hour: A little bit of difficulty  Running on even ground: Extreme difficulty or unable to perform activity  Running on uneven ground: Extreme difficulty or unable to perform activity  Making sharp turns while running fast: Extreme difficulty or unable to perform activity  Hopping: Quite a bit of difficulty  Rolling over in bed: A little bit of difficulty  Total: 29      Time Calculation:   Start Time: 0835  Stop Time: 0913  Time Calculation (min): 38 min     Therapy Charges for Today     Code Description Service Date Service Provider Modifiers Qty    49659516811 HC PT EVAL MOD COMPLEXITY 3 2/5/2019 Acacia Clifton, PT GP 1    47627071821 HC PT THER SUPP EA 15 MIN 2/5/2019 Acacia Clifton, PT GP 1          PT G-Codes  Outcome Measure Options: Lower Extremity Functional Scale (LEFS)  Total: 29         Acacia Clifton PT, DPT, CSCS  2/5/2019

## 2019-02-05 NOTE — PATIENT INSTRUCTIONS
Hyperthyroidism  Hyperthyroidism is when the thyroid is too active (overactive). Your thyroid is a large gland that is located in your neck. The thyroid helps to control how your body uses food (metabolism). When your thyroid is overactive, it produces too much of a hormone called thyroxine.  What are the causes?  Causes of hyperthyroidism may include:  · Graves disease. This is when your immune system attacks the thyroid gland. This is the most common cause.  · Inflammation of the thyroid gland.  · Tumor in the thyroid gland or somewhere else.  · Excessive use of thyroid medicines, including:  ? Prescription thyroid supplement.  ? Herbal supplements that mimic thyroid hormones.  · Solid or fluid-filled lumps within your thyroid gland (thyroid nodules).  · Excessive ingestion of iodine.    What increases the risk?  · Being female.  · Having a family history of thyroid conditions.  What are the signs or symptoms?  Signs and symptoms of hyperthyroidism may include:  · Nervousness.  · Inability to tolerate heat.  · Unexplained weight loss.  · Diarrhea.  · Change in the texture of hair or skin.  · Heart skipping beats or making extra beats.  · Rapid heart rate.  · Loss of menstruation.  · Shaky hands.  · Fatigue.  · Restlessness.  · Increased appetite.  · Sleep problems.  · Enlarged thyroid gland or nodules.    How is this diagnosed?  Diagnosis of hyperthyroidism may include:  · Medical history and physical exam.  · Blood tests.  · Ultrasound tests.    How is this treated?  Treatment may include:  · Medicines to control your thyroid.  · Surgery to remove your thyroid.  · Radiation therapy.    Follow these instructions at home:  · Take medicines only as directed by your health care provider.  · Do not use any tobacco products, including cigarettes, chewing tobacco, or electronic cigarettes. If you need help quitting, ask your health care provider.  · Do not exercise or do physical activity until your health care provider  approves.  · Keep all follow-up appointments as directed by your health care provider. This is important.  Contact a health care provider if:  · Your symptoms do not get better with treatment.  · You have fever.  · You are taking thyroid replacement medicine and you:  ? Have depression.  ? Feel mentally and physically slow.  ? Have weight gain.  Get help right away if:  · You have decreased alertness or a change in your awareness.  · You have abdominal pain.  · You feel dizzy.  · You have a rapid heartbeat.  · You have an irregular heartbeat.  This information is not intended to replace advice given to you by your health care provider. Make sure you discuss any questions you have with your health care provider.  Document Released: 12/18/2006 Document Revised: 05/18/2017 Document Reviewed: 05/05/2015  [employer] Interactive Patient Education © 2018 [employer]    Living With Anxiety  After being diagnosed with an anxiety disorder, you may be relieved to know why you have felt or behaved a certain way. It is natural to also feel overwhelmed about the treatment ahead and what it will mean for your life. With care and support, you can manage this condition and recover from it.  How to cope with anxiety  Dealing with stress  Stress is your body’s reaction to life changes and events, both good and bad. Stress can last just a few hours or it can be ongoing. Stress can play a major role in anxiety, so it is important to learn both how to cope with stress and how to think about it differently.  Talk with your health care provider or a counselor to learn more about stress reduction. He or she may suggest some stress reduction techniques, such as:  · Music therapy. This can include creating or listening to music that you enjoy and that inspires you.  · Mindfulness-based meditation. This involves being aware of your normal breaths, rather than trying to control your breathing. It can be done while sitting or  walking.  · Centering prayer. This is a kind of meditation that involves focusing on a word, phrase, or sacred image that is meaningful to you and that brings you peace.  · Deep breathing. To do this, expand your stomach and inhale slowly through your nose. Hold your breath for 3-5 seconds. Then exhale slowly, allowing your stomach muscles to relax.  · Self-talk. This is a skill where you identify thought patterns that lead to anxiety reactions and correct those thoughts.  · Muscle relaxation. This involves tensing muscles then relaxing them.    Choose a stress reduction technique that fits your lifestyle and personality. Stress reduction techniques take time and practice. Set aside 5-15 minutes a day to do them. Therapists can offer training in these techniques. The training may be covered by some insurance plans. Other things you can do to manage stress include:  · Keeping a stress diary. This can help you learn what triggers your stress and ways to control your response.  · Thinking about how you respond to certain situations. You may not be able to control everything, but you can control your reaction.  · Making time for activities that help you relax, and not feeling guilty about spending your time in this way.    Therapy combined with coping and stress-reduction skills provides the best chance for successful treatment.  Medicines  Medicines can help ease symptoms. Medicines for anxiety include:  · Anti-anxiety drugs.  · Antidepressants.  · Beta-blockers.    Medicines may be used as the main treatment for anxiety disorder, along with therapy, or if other treatments are not working. Medicines should be prescribed by a health care provider.  Relationships  Relationships can play a big part in helping you recover. Try to spend more time connecting with trusted friends and family members. Consider going to couples counseling, taking family education classes, or going to family therapy. Therapy can help you and  others better understand the condition.  How to recognize changes in your condition  Everyone has a different response to treatment for anxiety. Recovery from anxiety happens when symptoms decrease and stop interfering with your daily activities at home or work. This may mean that you will start to:  · Have better concentration and focus.  · Sleep better.  · Be less irritable.  · Have more energy.  · Have improved memory.    It is important to recognize when your condition is getting worse. Contact your health care provider if your symptoms interfere with home or work and you do not feel like your condition is improving.  Where to find help and support:  You can get help and support from these sources:  · Self-help groups.  · Online and community organizations.  · A trusted spiritual leader.  · Couples counseling.  · Family education classes.  · Family therapy.    Follow these instructions at home:  · Eat a healthy diet that includes plenty of vegetables, fruits, whole grains, low-fat dairy products, and lean protein. Do not eat a lot of foods that are high in solid fats, added sugars, or salt.  · Exercise. Most adults should do the following:  ? Exercise for at least 150 minutes each week. The exercise should increase your heart rate and make you sweat (moderate-intensity exercise).  ? Strengthening exercises at least twice a week.  · Cut down on caffeine, tobacco, alcohol, and other potentially harmful substances.  · Get the right amount and quality of sleep. Most adults need 7-9 hours of sleep each night.  · Make choices that simplify your life.  · Take over-the-counter and prescription medicines only as told by your health care provider.  · Avoid caffeine, alcohol, and certain over-the-counter cold medicines. These may make you feel worse. Ask your pharmacist which medicines to avoid.  · Keep all follow-up visits as told by your health care provider. This is important.  Questions to ask your health care  provider  · Would I benefit from therapy?  · How often should I follow up with a health care provider?  · How long do I need to take medicine?  · Are there any long-term side effects of my medicine?  · Are there any alternatives to taking medicine?  Contact a health care provider if:  · You have a hard time staying focused or finishing daily tasks.  · You spend many hours a day feeling worried about everyday life.  · You become exhausted by worry.  · You start to have headaches, feel tense, or have nausea.  · You urinate more than normal.  · You have diarrhea.  Get help right away if:  · You have a racing heart and shortness of breath.  · You have thoughts of hurting yourself or others.  If you ever feel like you may hurt yourself or others, or have thoughts about taking your own life, get help right away. You can go to your nearest emergency department or call:  · Your local emergency services (911 in the [place]  · A suicide crisis helpline, such as the National Suicide Prevention Lifeline at 1-139.346.3869. This is open 24-hours a day.    Summary  · Taking steps to deal with stress can help calm you.  · Medicines cannot cure anxiety disorders, but they can help ease symptoms.  · Family, friends, and partners can play a big part in helping you recover from an anxiety disorder.  This information is not intended to replace advice given to you by your health care provider. Make sure you discuss any questions you have with your health care provider.  Document Released: 12/12/2017 Document Revised: 12/12/2017 Document Reviewed: 12/12/2017  [employer] Interactive Patient Education © 2018 [employer]    Back Pain, Adult  Many adults have back pain from time to time. Common causes of back pain include:  · A strained muscle or ligament.  · Wear and tear (degeneration) of the spinal disks.  · Arthritis.  · A hit to the back.    Back pain can be short-lived (acute) or last a long time (chronic). A physical exam, lab tests,  and imaging studies may be done to find the cause of your pain.  Follow these instructions at home:  Managing pain and stiffness  · Take over-the-counter and prescription medicines only as told by your health care provider.  · If directed, apply heat to the affected area as often as told by your health care provider. Use the heat source that your health care provider recommends, such as a moist heat pack or a heating pad.  ? Place a towel between your skin and the heat source.  ? Leave the heat on for 20-30 minutes.  ? Remove the heat if your skin turns bright red. This is especially important if you are unable to feel pain, heat, or cold. You have a greater risk of getting burned.  · If directed, apply ice to the injured area:  ? Put ice in a plastic bag.  ? Place a towel between your skin and the bag.  ? Leave the ice on for 20 minutes, 2-3 times a day for the first 2-3 days.  Activity  · Do not stay in bed. Resting more than 1-2 days can delay your recovery.  · Take short walks on even surfaces as soon as you are able. Try to increase the length of time you walk each day.  · Do not sit, drive, or  one place for more than 30 minutes at a time. Sitting or standing for long periods of time can put stress on your back.  · Use proper lifting techniques. When you bend and lift, use positions that put less stress on your back:  ? Bend your knees.  ? Keep the load close to your body.  ? Avoid twisting.  · Exercise regularly as told by your health care provider. Exercising will help your back heal faster. This also helps prevent back injuries by keeping muscles strong and flexible.  · Your health care provider may recommend that you see a physical therapist. This person can help you come up with a safe exercise program. Do any exercises as told by your physical therapist.  Lifestyle  · Maintain a healthy weight. Extra weight puts stress on your back and makes it difficult to have good posture.  · Avoid activities  or situations that make you feel anxious or stressed. Learn ways to manage anxiety and stress. One way to manage stress is through exercise. Stress and anxiety increase muscle tension and can make back pain worse.  General instructions  · Sleep on a firm mattress in a comfortable position. Try lying on your side with your knees slightly bent. If you lie on your back, put a pillow under your knees.  · Follow your treatment plan as told by your health care provider. This may include:  ? Cognitive or behavioral therapy.  ? Acupuncture or massage therapy.  ? Meditation or yoga.  Contact a health care provider if:  · You have pain that is not relieved with rest or medicine.  · You have increasing pain going down into your legs or buttocks.  · Your pain does not improve in 2 weeks.  · You have pain at night.  · You lose weight.  · You have a fever or chills.  Get help right away if:  · You develop new bowel or bladder control problems.  · You have unusual weakness or numbness in your arms or legs.  · You develop nausea or vomiting.  · You develop abdominal pain.  · You feel faint.  Summary  · Many adults have back pain from time to time. A physical exam, lab tests, and imaging studies may be done to find the cause of your pain.  · Use proper lifting techniques. When you bend and lift, use positions that put less stress on your back.  · Take over-the-counter and prescription medicines and apply heat or ice as directed by your health care provider.  This information is not intended to replace advice given to you by your health care provider. Make sure you discuss any questions you have with your health care provider.  Document Released: 12/18/2006 Document Revised: 01/22/2018 Document Reviewed: 01/22/2018  [employer] Interactive Patient Education © 2018 [employer]

## 2019-02-06 ENCOUNTER — TELEPHONE (OUTPATIENT)
Dept: FAMILY MEDICINE CLINIC | Facility: CLINIC | Age: 39
End: 2019-02-06

## 2019-02-06 NOTE — TELEPHONE ENCOUNTER
----- Message from ROSSY Matthews sent at 2/6/2019 11:07 AM CST -----  Arthritis noted in her lower back.

## 2019-02-07 ENCOUNTER — TELEPHONE (OUTPATIENT)
Dept: FAMILY MEDICINE CLINIC | Facility: CLINIC | Age: 39
End: 2019-02-07

## 2019-02-07 NOTE — TELEPHONE ENCOUNTER
----- Message from ROSSY Matthews sent at 2/6/2019 10:00 PM CST -----  A1C continues to be elevated at 6.7 which is diagnostic of diabetes, but is less than 7 which is at goal for diabetes management.  Thyroid levels are normal.  Creatinine is slightly elevated. I would recommend a referral to nephrology if she is open to this?

## 2019-02-07 NOTE — TELEPHONE ENCOUNTER
----- Message from ROSSY Matthews sent at 2/6/2019 10:00 PM CST -----  A1C continues to be elevated at 6.7 which is diagnostic of diabetes, but is less than 7 which is at goal for diabetes management.  Thyroid levels are normal.  Creatinine is slightly elevated. I would recommend a referral to nephrology if she is open to this?     Pt is willing to see Nephrology

## 2019-02-08 DIAGNOSIS — N28.9 ABNORMAL KIDNEY FUNCTION: Primary | ICD-10-CM

## 2019-02-13 ENCOUNTER — HOSPITAL ENCOUNTER (OUTPATIENT)
Dept: PHYSICAL THERAPY | Facility: HOSPITAL | Age: 39
Setting detail: THERAPIES SERIES
Discharge: HOME | End: 2019-02-13

## 2019-02-13 DIAGNOSIS — M23.92 INTERNAL DERANGEMENT OF LEFT KNEE: ICD-10-CM

## 2019-02-13 DIAGNOSIS — M25.562 LEFT KNEE PAIN, UNSPECIFIED CHRONICITY: Primary | ICD-10-CM

## 2019-02-13 PROCEDURE — 97110 THERAPEUTIC EXERCISES: CPT | Performed by: SPECIALIST/TECHNOLOGIST

## 2019-02-15 ENCOUNTER — HOSPITAL ENCOUNTER (OUTPATIENT)
Dept: PHYSICAL THERAPY | Facility: HOSPITAL | Age: 39
Setting detail: THERAPIES SERIES
Discharge: HOME | End: 2019-02-15

## 2019-02-15 DIAGNOSIS — M25.562 LEFT KNEE PAIN, UNSPECIFIED CHRONICITY: Primary | ICD-10-CM

## 2019-02-15 DIAGNOSIS — M23.92 INTERNAL DERANGEMENT OF LEFT KNEE: ICD-10-CM

## 2019-02-15 PROCEDURE — 97110 THERAPEUTIC EXERCISES: CPT

## 2019-02-15 NOTE — THERAPY TREATMENT NOTE
Outpatient Physical Therapy Ortho Treatment Note  [facility]     Patient Name: Connie Lopez  : 1980  MRN: [id number]  Today's Date: 2/15/2019      Visit Date: 02/15/2019     Patients reports pain as:  8/11   Patient reports overall % improvement as:  0%   Patients attendance has been:  3/3   Insurance visits available  9 visits   Recert Date is:  19   Next MD visit is:  TBD              Visit Dx:    ICD-10-CM ICD-9-CM   1. Left knee pain, unspecified chronicity M25.562 719.46   2. Internal derangement of left knee M23.92 717.9       Patient Active Problem List   Diagnosis   • Essential hypertension   • Vitamin D deficiency   • Hyperthyroidism   • Left knee pain   • Morbidly obese (CMS/HCC)   • Hyperglycemia   • Anxiety        Past Medical History:   Diagnosis Date   • Adrenal gland hyperfunction (CMS/HCC)    • Alopecia    • Anemia    • Essential hypertension    • Graves' disease    • Heartburn    • Hyperthyroidism    • IFG (impaired fasting glucose)    • Mineral deficiency     iron deficiency   • Vitamin D deficiency         Past Surgical History:   Procedure Laterality Date   •  SECTION      x2   • MOUTH SURGERY     • TOTAL ABDOMINAL HYSTERECTOMY WITH SALPINGO OOPHORECTOMY  2015       PT Ortho     Row Name 19 0900       Posture/Observations    Posture/Observations Comments  no acute distress.  FWB, non-antalgic.  arise from seated posture without difficulty.  full revolutions pro2 from begining.   (Pended)   -ML       Left Lower Ext    Lt Knee Extension/Flexion AROM  10°-0°-102°  (Pended)   -ML      User Key  (r) = Recorded By, (t) = Taken By, (c) = Cosigned By    Initials Name Provider Type    Willi Wheat, ATC                       PT Assessment/Plan     Row Name 02/15/19 1100          PT Assessment    Assessment Comments  Very compliant with instructions.  Tolerated all increases in reps or quality of exercises.  (Pended)    "-KM        PT Plan    PT Frequency  2x/week  (Pended)   -KM     PT Plan Comments  cont per eval POC.  progressing ROM, strength, mechanics.    (Pended)   -KM       User Key  (r) = Recorded By, (t) = Taken By, (c) = Cosigned By    Initials Name Provider Type    MEGHANA BraggerJagdish, Kopi           Modalities     Row Name 02/15/19 1100             Ice    Ice Applied  No  (Pended)   -KM      Patient reports will apply ice at home to involved area  Yes  (Pended)   -KM        User Key  (r) = Recorded By, (t) = Taken By, (c) = Cosigned By    Initials Name Provider Type    MEGHANA ReynaJagdish, Kopi           Exercises     Row Name 02/15/19 1100             Subjective Comments    Subjective Comments  Continued pain in anterior aspect of knee.  (Pended)   -KM         Subjective Pain    Able to rate subjective pain?  yes  (Pended)   -KM      Pre-Treatment Pain Level  7  (Pended)   -KM      Post-Treatment Pain Level  7  (Pended)   -KM         Exercise 1    Exercise Name 1  pro2 LE/UE  (Pended)   -KM      Time 1  10 min  (Pended)   -KM      Additional Comments  L4  (Pended)   -KM         Exercise 2    Exercise Name 2  L-SAQ  (Pended)   -KM      Sets 2  2  (Pended)   -KM      Reps 2  15  (Pended)   -KM         Exercise 3    Exercise Name 3  L-SLR  (Pended)   -KM      Sets 3  2  (Pended)   -KM      Reps 3  10  (Pended)   -KM      Additional Comments  w/ 3 sec hold  (Pended)   -KM         Exercise 4    Exercise Name 4  Heel slides w/ strap  (Pended)   -KM      Time 4  5 min  (Pended)   -KM      Additional Comments  quad set at extension  (Pended)   -KM         Exercise 5    Exercise Name 5  Lateral step ups  (Pended)   -KM      Sets 5  2  (Pended)   -KM      Reps 5  10  (Pended)   -KM      Additional Comments  4\" step-both  (Pended)   -KM         Exercise 6    Exercise Name 6  St. L/R Ham Curls  (Pended)   -KM      Sets 6  2  (Pended)   -KM      Reps 6  10  (Pended)   -KM      Additional Comments  both  " (Pended)   -KM        User Key  (r) = Recorded By, (t) = Taken By, (c) = Cosigned By    Initials Name Provider Type    Jagdish Holt, ATC                          PT OP Goals     Row Name 02/15/19 1100          PT Short Term Goals    STG Date to Achieve  02/26/19  (Pended)   -KM     STG 1  I with HEP and have additions/changes by next recertification.  (Pended)   -KM     STG 1 Progress  Ongoing  (Pended)   -KM     STG 2  Patient able to control hyperextension in B knees.  (Pended)   -KM     STG 2 Progress  Ongoing  (Pended)   -KM     STG 3  AROM L knee >= 100°.  (Pended)   -KM     STG 3 Progress  Met  (Pended)   -KM     STG 4  B Quads 4+/5 or greater.  (Pended)   -KM     STG 4 Progress  Ongoing  (Pended)   -KM     STG 5  B HS 5/5.  (Pended)   -KM     STG 5 Progress  Ongoing  (Pended)   -KM        Long Term Goals    LTG Date to Achieve  03/15/19  (Pended)   -KM     LTG 1  AROm B knees >= 115°.  (Pended)   -KM     LTG 1 Progress  Ongoing  (Pended)   -KM     LTG 2  B LE 5/5.  (Pended)   -KM     LTG 2 Progress  Ongoing  (Pended)   -KM     LTG 3  Circum measurements joint effusion witihn 1 cm or R=L. at the knee joint line.  (Pended)   -KM     LTG 3 Progress  Ongoing  (Pended)   -KM     LTG 4  Patient able to ambulate up/down 3 steps reciprocally x10, HRA for balance.  (Pended)   -KM     LTG 4 Progress  Ongoing  (Pended)   -KM     LTG 5  Patient able to ambulate 1/2 mile non-antigically with no increase in pain.  (Pended)   -KM     LTG 5 Progress  Ongoing  (Pended)   -KM     LTG 6  Patient able to make a full squat and return to standing with no increase in pain.  (Pended)   -KM     LTG 6 Progress  Ongoing  (Pended)   -KM     LTG 7  Patient able ot show proper lifting technique floor to waist.  (Pended)   -KM     LTG 7 Progress  Ongoing  (Pended)   -KM     LTG 8  I with final HEP.  (Pended)   -KM     LTG 8 Progress  Ongoing  (Pended)   -KM     LTG 9  D/C with a final HEP and free 30 day fitness  formula memembership.  (Pended)   -KM     LTG 9 Progress  Ongoing  (Pended)   -KM        Time Calculation    PT Goal Re-Cert Due Date  02/26/19  (Pended)   -       User Key  (r) = Recorded By, (t) = Taken By, (c) = Cosigned By    Initials Name Provider Type     Jagdish Orellana, ATC                          Time Calculation:   Start Time: (P) 0855  Stop Time: (P) 0941  Time Calculation (min): (P) 46 min  Total Timed Code Minutes- PT: (P) 46 minute(s)  Therapy Suggested Charges     Code   Minutes Charges    None           Therapy Charges for Today     Code Description Service Date Service Provider Modifiers Qty    98851638117 HC PT THER PROC EA 15 MIN 2/15/2019 Jagdish Orellana, ATC  3    28626555524 HC PT THER SUPP EA 15 MIN 2/15/2019 Jagdish Orellana, ATC  1                    Jagdish Orellana, ATC  2/15/2019

## 2019-02-19 ENCOUNTER — HOSPITAL ENCOUNTER (OUTPATIENT)
Dept: PHYSICAL THERAPY | Facility: HOSPITAL | Age: 39
Setting detail: THERAPIES SERIES
Discharge: HOME | End: 2019-02-19

## 2019-02-19 DIAGNOSIS — M25.562 LEFT KNEE PAIN, UNSPECIFIED CHRONICITY: ICD-10-CM

## 2019-02-19 DIAGNOSIS — M23.92 INTERNAL DERANGEMENT OF LEFT KNEE: Primary | ICD-10-CM

## 2019-02-19 PROCEDURE — 97110 THERAPEUTIC EXERCISES: CPT | Performed by: PHYSICAL THERAPIST

## 2019-02-19 NOTE — THERAPY TREATMENT NOTE
Outpatient Physical Therapy Ortho Treatment Note  [facility]     Patient Name: Connie Lopez  : 1980  MRN: [id number]  Today's Date: 2019      Visit Date: 2019     Patients reports pain as:  7/10   Patient reports overall % improvement as:  0%   Patients attendance has been:  4/   Insurance visits available  9 visits   Recert Date is:  19   Next MD visit is:  TBD               Visit Dx:    ICD-10-CM ICD-9-CM   1. Internal derangement of left knee M23.92 717.9   2. Left knee pain, unspecified chronicity M25.562 719.46       Patient Active Problem List   Diagnosis   • Essential hypertension   • Vitamin D deficiency   • Hyperthyroidism   • Left knee pain   • Morbidly obese (CMS/HCC)   • Hyperglycemia   • Anxiety        Past Medical History:   Diagnosis Date   • Adrenal gland hyperfunction (CMS/HCC)    • Alopecia    • Anemia    • Essential hypertension    • Graves' disease    • Heartburn    • Hyperthyroidism    • IFG (impaired fasting glucose)    • Mineral deficiency     iron deficiency   • Vitamin D deficiency         Past Surgical History:   Procedure Laterality Date   •  SECTION      x2   • MOUTH SURGERY     • TOTAL ABDOMINAL HYSTERECTOMY WITH SALPINGO OOPHORECTOMY  2015       PT Ortho     Row Name 19 0800       Subjective Comments    Subjective Comments  pt reports doing HEP daily.  -BS       Precautions and Contraindications    Precautions/Limitations  no known precautions/limitations  -BS       Subjective Pain    Able to rate subjective pain?  yes  -BS    Pre-Treatment Pain Level  7  -BS    Post-Treatment Pain Level  6  -BS      User Key  (r) = Recorded By, (t) = Taken By, (c) = Cosigned By    Initials Name Provider Type    Jon Ling, PT Physical Therapist                              Exercises     Row Name 19 0800             Subjective Comments    Subjective Comments  pt reports doing HEP daily.  -BS         Subjective  Pain    Able to rate subjective pain?  yes  -BS      Pre-Treatment Pain Level  7  -BS      Post-Treatment Pain Level  6  -BS         Exercise 1    Exercise Name 1  pro2 LE's  -BS      Time 1  10 min  -BS      Additional Comments  L 4  -BS         Exercise 2    Exercise Name 2  L LAQ's  -BS      Sets 2  1  -BS      Reps 2  20  -BS         Exercise 3    Exercise Name 3  seated resisted HS curls w/ red TB  -BS      Sets 3  1  -BS      Reps 3  20  -BS         Exercise 4    Exercise Name 4  SLS  -BS      Reps 4  1 min total  -BS         Exercise 6    Exercise Name 6  St. L/R Ham Curls  -BS      Sets 6  1  -BS      Reps 6  20  -BS        User Key  (r) = Recorded By, (t) = Taken By, (c) = Cosigned By    Initials Name Provider Type    BS Jon Ruiz, PT Physical Therapist                         PT OP Goals     Row Name 02/19/19 0845          PT Short Term Goals    STG Date to Achieve  02/26/19  -BS     STG 1  I with HEP and have additions/changes by next recertification.  -BS     STG 1 Progress  Ongoing  -BS     STG 2  Patient able to control hyperextension in B knees.  -BS     STG 2 Progress  Ongoing  -BS     STG 3  AROM L knee >= 100°.  -BS     STG 3 Progress  Met  -BS     STG 4  B Quads 4+/5 or greater.  -BS     STG 4 Progress  Ongoing  -BS     STG 5  B HS 5/5.  -BS     STG 5 Progress  Ongoing  -BS        Long Term Goals    LTG Date to Achieve  03/15/19  -BS     LTG 1  AROm B knees >= 115°.  -BS     LTG 1 Progress  Ongoing  -BS     LTG 2  B LE 5/5.  -BS     LTG 2 Progress  Ongoing  -BS     LTG 3  Circum measurements joint effusion witihn 1 cm or R=L. at the knee joint line.  -BS     LTG 3 Progress  Ongoing  -BS     LTG 4  Patient able to ambulate up/down 3 steps reciprocally x10, HRA for balance.  -BS     LTG 4 Progress  Ongoing  -BS     LTG 5  Patient able to ambulate 1/2 mile non-antigically with no increase in pain.  -BS     LTG 5 Progress  Ongoing  -BS     LTG 6  Patient able to make a full squat and return to  standing with no increase in pain.  -BS     LTG 6 Progress  Ongoing  -BS     LTG 7  Patient able ot show proper lifting technique floor to waist.  -BS     LTG 7 Progress  Ongoing  -BS     LTG 8  I with final HEP.  -BS     LTG 8 Progress  Ongoing  -BS     LTG 9  D/C with a final HEP and free 30 day fitness formula memembership.  -BS     LTG 9 Progress  Ongoing  -BS        Time Calculation    PT Goal Re-Cert Due Date  02/26/19  -BS       User Key  (r) = Recorded By, (t) = Taken By, (c) = Cosigned By    Initials Name Provider Type    Jon Ling, PT Physical Therapist          Therapy Education  Given: HEP  Program: Reinforced, New  How Provided: Demonstration, Verbal  Provided to: Patient  Level of Understanding: Teach back education performed              Time Calculation:   Start Time: 0847  Stop Time: 0932  Time Calculation (min): 45 min  Total Timed Code Minutes- PT: 45 minute(s)  Therapy Suggested Charges     Code   Minutes Charges    None           Therapy Charges for Today     Code Description Service Date Service Provider Modifiers Qty    44660795943 HC PT THER PROC EA 15 MIN 2/19/2019 Jon Ruiz, PT GP 3                    Jon Ruiz, PT  2/19/2019
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
## 2019-02-21 ENCOUNTER — APPOINTMENT (OUTPATIENT)
Dept: PHYSICAL THERAPY | Facility: HOSPITAL | Age: 39
End: 2019-02-21

## 2019-02-22 ENCOUNTER — HOSPITAL ENCOUNTER (OUTPATIENT)
Dept: PHYSICAL THERAPY | Facility: HOSPITAL | Age: 39
Setting detail: THERAPIES SERIES
Discharge: HOME | End: 2019-02-22

## 2019-02-22 DIAGNOSIS — M25.562 LEFT KNEE PAIN, UNSPECIFIED CHRONICITY: ICD-10-CM

## 2019-02-22 DIAGNOSIS — M23.92 INTERNAL DERANGEMENT OF LEFT KNEE: Primary | ICD-10-CM

## 2019-02-22 PROCEDURE — 97110 THERAPEUTIC EXERCISES: CPT

## 2019-02-22 PROCEDURE — 97110 THERAPEUTIC EXERCISES: CPT | Performed by: PHYSICAL THERAPIST

## 2019-02-22 NOTE — THERAPY DISCHARGE NOTE
Outpatient Physical Therapy Ortho Progress Note/Discharge Summary  [facility]  Acacia Clifton, PT, DPT, CSCS       Patient Name: Connie Lopez  : 1980  MRN: [id number]  Today's Date: 2019      Visit Date: 2019     Pt reports 6/10 pain pre treatment, 0/10 pain post treatment  Reports 10-15% of improvement.  Attended 5/5 visits.  Insurance available: 9 viits  Next MD appt: 2019.  Recertification: N/A    Visit Dx:    ICD-10-CM ICD-9-CM   1. Internal derangement of left knee M23.92 717.9   2. Left knee pain, unspecified chronicity M25.562 719.46       Patient Active Problem List   Diagnosis   • Essential hypertension   • Vitamin D deficiency   • Hyperthyroidism   • Left knee pain   • Morbidly obese (CMS/HCC)   • Hyperglycemia   • Anxiety        Past Medical History:   Diagnosis Date   • Adrenal gland hyperfunction (CMS/HCC)    • Alopecia    • Anemia    • Essential hypertension    • Graves' disease    • Heartburn    • Hyperthyroidism    • IFG (impaired fasting glucose)    • Mineral deficiency     iron deficiency   • Vitamin D deficiency         Past Surgical History:   Procedure Laterality Date   •  SECTION      x2   • MOUTH SURGERY     • TOTAL ABDOMINAL HYSTERECTOMY WITH SALPINGO OOPHORECTOMY  2015     Number of days off work: N/A    Changes to medications: None noted.    Changes to MD orders: None noted.    PT Ortho     Row Name 19 0800       Subjective Comments    Subjective Comments  Patient reports she has good days and bad days.  -AJ       Precautions and Contraindications    Precautions/Limitations  no known precautions/limitations  -AJ       Subjective Pain    Able to rate subjective pain?  yes  -AJ    Pre-Treatment Pain Level  6  -AJ    Post-Treatment Pain Level  0  -TL       Posture/Observations    Alignment Options  Genu valgus  -AJ    Genu valgus  Bilateral:;Mild;Moderate;Increased  -AJ    Observations  Edema  -AJ     Posture/Observations Comments  no acute distress.  FWB, non-antalgic.  arise from seated posture without difficulty.  full revolutions pro2 from begining.   -AJ       Special Tests/Palpation    Special Tests/Palpation  -- Mild L anterior knee global TTP.  -AJ       Knee Special Tests    Anterior drawer (ACL lesion)  Bilateral:;Negative  -AJ    Lachman’s (ACL lesion)  Bilateral:;Negative  -AJ    Posterior drawer (PCL lesion)  Bilateral:;Negative  -AJ    Posterior sag sign (PCL lesion)  Bilateral:;Negative  -AJ    Valgus stress (MCL lesion)  Bilateral:;Negative  -AJ    Varus stress (LCL lesion)  Bilateral:;Negative  -AJ    Pivot shift test (ACL lesion)  Bilateral:;Negative  -AJ    Apley’s distraction test (meniscal lesion vs OA)  Left:;Positive  -AJ    Apley’s compression test (meniscal lesion vs OA)  Left:;Positive  -AJ    Abisai’s test (meniscal lesion)  Left:;Positive  -AJ    Reverse pivot shift test (posterolateral instability)  Bilateral:;Negative  -AJ    Patellar grind test (chondromalacia patella)  Bilateral:;Positive  -AJ    Patellar ballotment test (joint effusion)  Bilateral:;Negative  -AJ    Patellofemoral apprehension sign (instability)  Bilateral:;Negative  -AJ       Right Lower Ext    Rt Knee Extension/Flexion AROM  10°-0°-120°  -AJ       Left Lower Ext    Lt Knee Extension/Flexion AROM  10°-0°-95°  -AJ       MMT (Manual Muscle Testing)    General MMT Comments  B LE 5/5, except R Quads 4+/5, L 4+/5 with pain and B HS 4+/5.  -AJ       Sensation    Sensation WNL?  WNL  -AJ    Light Touch  No apparent deficits  -AJ    Additional Comments  Patient denies any numbnessor tingling.  -AJ       Lower Extremity Flexibility    Hamstrings  Bilateral:;Mildly limited  -AJ    ITB  Bilateral:;Mildly limited  -AJ       RLE Quick Girth (cm)    Other 1  40.3 cm  -AJ       LLE Quick Girth (cm)    Other 1  41.5 cm  -AJ       Transfers    Comment (Transfers)  I with all transfers.  -AJ       Gait/Stairs Assessment/Training     Comment (Gait/Stairs)  FWB, non-antalgic gait, no distress.  -      User Key  (r) = Recorded By, (t) = Taken By, (c) = Cosigned By    Initials Name Provider Type    Acacia Matute, PT Physical Therapist    Poppy Mcgrath PTA Physical Therapy Assistant         Barriers to Rehab: Include significant or possible arthritic/degenerative changes that have occurred within the joint,The chronicity of this issue, The patient's obesity.      Safety Issues: None noted.    PT Assessment/Plan     Row Name 02/22/19 0800          PT Assessment    Functional Limitations  Limitation in home management;Limitations in community activities;Performance in leisure activities;Performance in self-care ADL;Performance in work activities  -     Impairments  Balance;Edema;Gait;Impaired flexibility;Impaired muscle endurance;Impaired muscle length;Impaired muscle power;Range of motion;Pain;Muscle strength;Joint mobility;Joint integrity  -     Assessment Comments  Patient has met some goals but still has significant edema, ROM restrictions, and decrease in function.  -AJ     Rehab Potential  Fair  -     Patient/caregiver participated in establishment of treatment plan and goals  Yes  -AJ     Patient would benefit from skilled therapy intervention  Yes  -AJ        PT Plan    PT Frequency  -- N/A  -AJ     Predicted Duration of Therapy Intervention (Therapy Eval)  N/A  -AJ     PT Plan Comments  D/C today with a final HEP and free 30 day fitness formula memembership.  -       User Key  (r) = Recorded By, (t) = Taken By, (c) = Cosigned By    Initials Name Provider Type    Acacia Matute, PT Physical Therapist          Modalities     Row Name 02/22/19 0800             Ice    Ice Applied  Yes  -TL      Location  left knee  -TL      Rx Minutes  15 mins  -TL      Ice S/P Rx  Yes  -TL        User Key  (r) = Recorded By, (t) = Taken By, (c) = Cosigned By    Initials Name Provider Type    Poppy Mcgrath PTA  Physical Therapy Assistant          Exercises     Row Name 02/22/19 0800             Subjective Comments    Subjective Comments  Patient reports she has good days and bad days.  -AJ         Subjective Pain    Able to rate subjective pain?  yes  -AJ      Pre-Treatment Pain Level  6  -AJ      Post-Treatment Pain Level  0  -TL         Exercise 1    Exercise Name 1  Pro II LEs  -AJ      Time 1  10 minutes  -AJ      Additional Comments  L 5.0  -AJ         Exercise 2    Exercise Name 2  B St. HS S Simultaneous filing. User may be unaware of other data.  -AJ      Reps 2  2 Simultaneous filing. User may be unaware of other data.  -AJ      Time 2  30 seconds  -AJ         Exercise 3    Exercise Name 3  seated resisted HS curls w/ red TB  -TL      Reps 3  20  -TL      Additional Comments  educated on doing it herself with the band.  -TL         Exercise 4    Exercise Name 4  SLR 4-way  -TL      Sets 4  2  -TL      Reps 4  10  -TL      Time 4  5 sec hold  -TL         Exercise 5    Exercise Name 5  semi-reclined heelslides  -TL      Time 5  5 mins  -TL        User Key  (r) = Recorded By, (t) = Taken By, (c) = Cosigned By    Initials Name Provider Type    AJ Acacia Clifton, PT Physical Therapist    TL Poppy Meredith, PTA Physical Therapy Assistant                         PT OP Goals     Row Name 02/22/19 0800          PT Short Term Goals    STG Date to Achieve  02/26/19  -AJ     STG 1  I with HEP and have additions/changes by next recertification.  -AJ     STG 1 Progress  Met  -AJ     STG 2  Patient able to control hyperextension in B knees.  -AJ     STG 2 Progress  Met  -     STG 3  AROM L knee >= 100°.  -AJ     STG 3 Progress  Partially Met;Ongoing;Progressing  -     STG 4  B Quads 4+/5 or greater.  -AJ     STG 4 Progress  Met  -AJ     STG 5  B HS 5/5.  -AJ     STG 5 Progress  Met  -        Long Term Goals    LTG Date to Achieve  03/15/19  -AJ     LTG 1  AROm B knees >= 115°.  -AJ     LTG 1 Progress   Ongoing;Partially Met  -     LTG 2  B LE 5/5.  -     LTG 2 Progress  Ongoing;Partially Met  -     LTG 3  Circum measurements joint effusion witihn 1 cm or R=L. at the knee joint line.  -     LTG 3 Progress  Not Met;Ongoing  -     LTG 4  Patient able to ambulate up/down 3 steps reciprocally x10, HRA for balance.  -     LTG 4 Progress  Not Met  -     LTG 5  Patient able to ambulate 1/2 mile non-antigically with no increase in pain.  -     LTG 5 Progress  Not Met  -     LTG 6  Patient able to make a full squat and return to standing with no increase in pain.  -     LTG 6 Progress  Not Met  -     LTG 7  Patient able ot show proper lifting technique floor to waist.  -     LTG 7 Progress  Not Met  -     LTG 8  I with final HEP.  -     LTG 8 Progress  Met  -     LTG 9  D/C with a final HEP and free 30 day fitness formula memembership.  -     LTG 9 Progress  Met  -        Time Calculation    PT Goal Re-Cert Due Date  -- N/A  -       User Key  (r) = Recorded By, (t) = Taken By, (c) = Cosigned By    Initials Name Provider Type    Acacia Matute, PT Physical Therapist          Therapy Education  Education Details: 4-way SLR, supine heelslides, Not to hyperextends  Given: HEP, Symptoms/condition management, Pain management, Posture/body mechanics  Program: New, Reinforced  How Provided: Verbal, Demonstration, Written  Provided to: Patient  Level of Understanding: Teach back education performed, Verbalized, Demonstrated    Outcome Measure Options: Lower Extremity Functional Scale (LEFS)  Lower Extremity Functional Index  Any of your usual work, housework or school activities: Moderate difficulty  Your usual hobbies, recreational or sporting activities: Moderate difficulty  Getting into or out of the bath: Quite a bit of difficulty  Walking between rooms: Moderate difficulty  Putting on your shoes or socks: Quite a bit of difficulty  Squatting: Quite a bit of difficulty  Lifting an  object, like a bag of groceries from the floor: A little bit of difficulty  Performing light activities around your home: A little bit of difficulty  Performing heavy activities around your home: Moderate difficulty  Getting into or out of a car: Moderate difficulty  Walking 2 blocks: Quite a bit of difficulty  Walking a mile: Quite a bit of difficulty  Going up or down 10 stairs (about 1 flight of stairs): Quite a bit of difficulty  Standing for 1 hour: Moderate difficulty  Sitting for 1 hour: A little bit of difficulty  Running on even ground: Extreme difficulty or unable to perform activity  Running on uneven ground: Extreme difficulty or unable to perform activity  Making sharp turns while running fast: Extreme difficulty or unable to perform activity  Hopping: Extreme difficulty or unable to perform activity  Rolling over in bed: A little bit of difficulty  Total: 30      Time Calculation:   Start Time: 0813  Stop Time: 0924  Time Calculation (min): 71 min  PT Non-Billable Time (min): 15 min  Total Timed Code Minutes- PT: 56 minute(s)    Therapy Charges for Today     Code Description Service Date Service Provider Modifiers Qty    59444242734 HC PT THER PROC EA 15 MIN 2/22/2019 Acacia Clifton, PT GP 2    90137434855 HC PT THER SUPP EA 15 MIN 2/22/2019 Acacia Clifton, PT GP 1          PT G-Codes  Outcome Measure Options: Lower Extremity Functional Scale (LEFS)  Total: 30     OP PT Discharge Summary  Date of Discharge: 02/22/19  Reason for Discharge: Independent  Outcomes Achieved: Patient able to partially acheive established goals, Refer to plan of care for updates on goals achieved  Discharge Destination: Home with home program  Discharge Instructions/Additional Comments: D/C with a final HEP and free 30 day fitness formula memembership.      Acacia Clifton, PT, DPT, CSCS  2/22/2019

## 2019-02-26 ENCOUNTER — HOSPITAL ENCOUNTER (OUTPATIENT)
Facility: HOSPITAL | Age: 39
Setting detail: HOSPITAL OUTPATIENT SURGERY
End: 2019-02-26
Attending: INTERNAL MEDICINE | Admitting: INTERNAL MEDICINE

## 2019-02-26 ENCOUNTER — OFFICE VISIT (OUTPATIENT)
Dept: GASTROENTEROLOGY | Facility: CLINIC | Age: 39
End: 2019-02-26

## 2019-02-26 VITALS
WEIGHT: 248 LBS | DIASTOLIC BLOOD PRESSURE: 76 MMHG | SYSTOLIC BLOOD PRESSURE: 128 MMHG | HEART RATE: 84 BPM | HEIGHT: 67 IN | BODY MASS INDEX: 38.92 KG/M2

## 2019-02-26 DIAGNOSIS — R10.33 PERIUMBILICAL ABDOMINAL PAIN: ICD-10-CM

## 2019-02-26 DIAGNOSIS — R10.10 PAIN OF UPPER ABDOMEN: ICD-10-CM

## 2019-02-26 DIAGNOSIS — R19.7 DIARRHEA, UNSPECIFIED TYPE: ICD-10-CM

## 2019-02-26 DIAGNOSIS — K59.00 CONSTIPATION, UNSPECIFIED CONSTIPATION TYPE: ICD-10-CM

## 2019-02-26 DIAGNOSIS — R79.89 ELEVATED SERUM CREATININE: ICD-10-CM

## 2019-02-26 DIAGNOSIS — K21.00 GASTROESOPHAGEAL REFLUX DISEASE WITH ESOPHAGITIS: Primary | ICD-10-CM

## 2019-02-26 PROCEDURE — 99214 OFFICE O/P EST MOD 30 MIN: CPT | Performed by: PHYSICIAN ASSISTANT

## 2019-02-26 RX ORDER — DEXTROSE AND SODIUM CHLORIDE 5; .45 G/100ML; G/100ML
30 INJECTION, SOLUTION INTRAVENOUS CONTINUOUS PRN
Status: CANCELLED | OUTPATIENT
Start: 2019-02-26

## 2019-02-27 ENCOUNTER — APPOINTMENT (OUTPATIENT)
Dept: PHYSICAL THERAPY | Facility: HOSPITAL | Age: 39
End: 2019-02-27

## 2019-03-01 ENCOUNTER — APPOINTMENT (OUTPATIENT)
Dept: PHYSICAL THERAPY | Facility: HOSPITAL | Age: 39
End: 2019-03-01

## 2019-03-21 ENCOUNTER — TELEPHONE (OUTPATIENT)
Dept: FAMILY MEDICINE CLINIC | Facility: CLINIC | Age: 39
End: 2019-03-21

## 2019-04-15 ENCOUNTER — TELEPHONE (OUTPATIENT)
Dept: FAMILY MEDICINE CLINIC | Facility: CLINIC | Age: 39
End: 2019-04-15

## 2019-04-16 ENCOUNTER — TELEPHONE (OUTPATIENT)
Dept: FAMILY MEDICINE CLINIC | Facility: CLINIC | Age: 39
End: 2019-04-16

## 2019-04-16 NOTE — TELEPHONE ENCOUNTER
Pt wants to know if you can provide her with a statement due to her Anxiety to support her reason for having a miniature Pincher dog.as a service dog?

## 2019-04-18 ENCOUNTER — TELEPHONE (OUTPATIENT)
Dept: FAMILY MEDICINE CLINIC | Facility: CLINIC | Age: 39
End: 2019-04-18

## 2019-04-18 PROBLEM — F32.A DEPRESSION: Status: ACTIVE | Noted: 2019-04-18

## 2019-04-18 NOTE — TELEPHONE ENCOUNTER
Nelia would you please call Ms Lopez about letter that she asked you to have Delilah write for her Phone [phone]

## 2019-04-18 NOTE — TELEPHONE ENCOUNTER
I am not able to certify a service dog.  A service dog is a dog that is provided to you for disability.  She may be thinking of an emotional support animal.  These animals are obtained by having a letter from her primary care provider, as well as going online and paying a fee.  Is this a new dog for her?  Or has she had this dog in the past?  It would likely be beneficial for her to come in for an appointment so we can further discuss this.

## 2019-04-19 ENCOUNTER — OFFICE VISIT (OUTPATIENT)
Dept: FAMILY MEDICINE CLINIC | Facility: CLINIC | Age: 39
End: 2019-04-19

## 2019-04-19 VITALS
WEIGHT: 253.3 LBS | DIASTOLIC BLOOD PRESSURE: 80 MMHG | HEART RATE: 81 BPM | TEMPERATURE: 98.3 F | RESPIRATION RATE: 20 BRPM | SYSTOLIC BLOOD PRESSURE: 120 MMHG | BODY MASS INDEX: 39.75 KG/M2 | OXYGEN SATURATION: 98 % | HEIGHT: 67 IN

## 2019-04-19 DIAGNOSIS — F33.1 MODERATE EPISODE OF RECURRENT MAJOR DEPRESSIVE DISORDER (HCC): ICD-10-CM

## 2019-04-19 DIAGNOSIS — F41.9 ANXIETY: Primary | ICD-10-CM

## 2019-04-19 PROCEDURE — 99213 OFFICE O/P EST LOW 20 MIN: CPT | Performed by: NURSE PRACTITIONER

## 2019-04-26 ENCOUNTER — TELEPHONE (OUTPATIENT)
Dept: FAMILY MEDICINE CLINIC | Facility: CLINIC | Age: 39
End: 2019-04-26

## 2019-04-26 DIAGNOSIS — B96.89 BV (BACTERIAL VAGINOSIS): Primary | ICD-10-CM

## 2019-04-26 DIAGNOSIS — N76.0 BV (BACTERIAL VAGINOSIS): Primary | ICD-10-CM

## 2019-04-26 RX ORDER — METRONIDAZOLE 7.5 MG/G
GEL VAGINAL NIGHTLY
Qty: 70 G | Refills: 0 | Status: SHIPPED | OUTPATIENT
Start: 2019-04-26 | End: 2019-07-30

## 2019-04-26 NOTE — TELEPHONE ENCOUNTER
Ms. Lopez is needing something called in for a bacterial infection. Pt states that she has had it before. Pharmacy is [facility]. Pt phone is [phone]
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
## 2019-05-09 ENCOUNTER — TELEPHONE (OUTPATIENT)
Dept: FAMILY MEDICINE CLINIC | Facility: CLINIC | Age: 39
End: 2019-05-09

## 2019-05-09 DIAGNOSIS — N76.0 BV (BACTERIAL VAGINOSIS): Primary | ICD-10-CM

## 2019-05-09 DIAGNOSIS — B96.89 BV (BACTERIAL VAGINOSIS): Primary | ICD-10-CM

## 2019-05-09 NOTE — TELEPHONE ENCOUNTER
Ms. Lopez says that you had given her a cream for a bacterial infection. She would like to have the pills instead sent to [facility].  Phone [phone]

## 2019-05-19 RX ORDER — METRONIDAZOLE 500 MG/1
500 TABLET ORAL 2 TIMES DAILY
Qty: 14 TABLET | Refills: 0 | Status: SHIPPED | OUTPATIENT
Start: 2019-05-19 | End: 2019-05-26

## 2019-07-30 ENCOUNTER — APPOINTMENT (OUTPATIENT)
Dept: LAB | Facility: HOSPITAL | Age: 39
End: 2019-07-30

## 2019-07-30 ENCOUNTER — OFFICE VISIT (OUTPATIENT)
Dept: FAMILY MEDICINE CLINIC | Facility: CLINIC | Age: 39
End: 2019-07-30

## 2019-07-30 VITALS
HEART RATE: 90 BPM | WEIGHT: 254.7 LBS | SYSTOLIC BLOOD PRESSURE: 122 MMHG | OXYGEN SATURATION: 96 % | BODY MASS INDEX: 39.98 KG/M2 | HEIGHT: 67 IN | TEMPERATURE: 98 F | DIASTOLIC BLOOD PRESSURE: 80 MMHG | RESPIRATION RATE: 20 BRPM

## 2019-07-30 DIAGNOSIS — E05.90 HYPERTHYROIDISM: Chronic | ICD-10-CM

## 2019-07-30 DIAGNOSIS — I10 ESSENTIAL HYPERTENSION: Chronic | ICD-10-CM

## 2019-07-30 DIAGNOSIS — F33.2 SEVERE EPISODE OF RECURRENT MAJOR DEPRESSIVE DISORDER, WITHOUT PSYCHOTIC FEATURES (HCC): ICD-10-CM

## 2019-07-30 DIAGNOSIS — R12 HEARTBURN: Primary | ICD-10-CM

## 2019-07-30 DIAGNOSIS — N28.9 ABNORMAL KIDNEY FUNCTION: ICD-10-CM

## 2019-07-30 DIAGNOSIS — R73.9 HYPERGLYCEMIA: ICD-10-CM

## 2019-07-30 DIAGNOSIS — R10.10 UPPER ABDOMINAL PAIN: ICD-10-CM

## 2019-07-30 DIAGNOSIS — F41.9 ANXIETY: ICD-10-CM

## 2019-07-30 DIAGNOSIS — E66.01 MORBIDLY OBESE (HCC): ICD-10-CM

## 2019-07-30 LAB — HBA1C MFR BLD: 6.6 % (ref 4.8–5.6)

## 2019-07-30 PROCEDURE — 80053 COMPREHEN METABOLIC PANEL: CPT | Performed by: NURSE PRACTITIONER

## 2019-07-30 PROCEDURE — 84439 ASSAY OF FREE THYROXINE: CPT | Performed by: NURSE PRACTITIONER

## 2019-07-30 PROCEDURE — 84443 ASSAY THYROID STIM HORMONE: CPT | Performed by: NURSE PRACTITIONER

## 2019-07-30 PROCEDURE — 83036 HEMOGLOBIN GLYCOSYLATED A1C: CPT | Performed by: NURSE PRACTITIONER

## 2019-07-30 PROCEDURE — 84481 FREE ASSAY (FT-3): CPT | Performed by: NURSE PRACTITIONER

## 2019-07-30 PROCEDURE — 99214 OFFICE O/P EST MOD 30 MIN: CPT | Performed by: NURSE PRACTITIONER

## 2019-07-30 RX ORDER — BUPROPION HYDROCHLORIDE 300 MG/1
300 TABLET ORAL DAILY
Qty: 91 TABLET | Refills: 1 | Status: SHIPPED | OUTPATIENT
Start: 2019-07-30 | End: 2019-10-29 | Stop reason: SDUPTHER

## 2019-07-30 RX ORDER — ESCITALOPRAM OXALATE 20 MG/1
20 TABLET ORAL DAILY
Qty: 91 TABLET | Refills: 1 | Status: SHIPPED | OUTPATIENT
Start: 2019-07-30 | End: 2019-08-26 | Stop reason: SDUPTHER

## 2019-07-31 ENCOUNTER — TELEPHONE (OUTPATIENT)
Dept: FAMILY MEDICINE CLINIC | Facility: CLINIC | Age: 39
End: 2019-07-31

## 2019-07-31 LAB
ALBUMIN SERPL-MCNC: 4.1 G/DL (ref 3.5–5.2)
ALBUMIN/GLOB SERPL: 1.1 G/DL
ALP SERPL-CCNC: 83 U/L (ref 39–117)
ALT SERPL W P-5'-P-CCNC: 15 U/L (ref 1–33)
ANION GAP SERPL CALCULATED.3IONS-SCNC: 11.9 MMOL/L (ref 5–15)
AST SERPL-CCNC: 16 U/L (ref 1–32)
BILIRUB SERPL-MCNC: 0.6 MG/DL (ref 0.2–1.2)
BUN BLD-MCNC: 11 MG/DL (ref 6–20)
BUN/CREAT SERPL: 10.5 (ref 7–25)
CALCIUM SPEC-SCNC: 9.8 MG/DL (ref 8.6–10.5)
CHLORIDE SERPL-SCNC: 102 MMOL/L (ref 98–107)
CO2 SERPL-SCNC: 28.1 MMOL/L (ref 22–29)
CREAT BLD-MCNC: 1.05 MG/DL (ref 0.57–1)
GFR SERPL CREATININE-BSD FRML MDRD: 70 ML/MIN/1.73
GLOBULIN UR ELPH-MCNC: 3.6 GM/DL
GLUCOSE BLD-MCNC: 120 MG/DL (ref 65–99)
POTASSIUM BLD-SCNC: 4.1 MMOL/L (ref 3.5–5.2)
PROT SERPL-MCNC: 7.7 G/DL (ref 6–8.5)
SODIUM BLD-SCNC: 142 MMOL/L (ref 136–145)
T3FREE SERPL-MCNC: 2.94 PG/ML (ref 2–4.4)
T4 FREE SERPL-MCNC: 0.86 NG/DL (ref 0.93–1.7)
TSH SERPL DL<=0.05 MIU/L-ACNC: 0.56 MIU/ML (ref 0.27–4.2)

## 2019-08-05 ENCOUNTER — TELEPHONE (OUTPATIENT)
Dept: FAMILY MEDICINE CLINIC | Facility: CLINIC | Age: 39
End: 2019-08-05

## 2019-08-05 NOTE — TELEPHONE ENCOUNTER
----- Message from ROSSY Matthews sent at 7/30/2019 10:41 PM CDT -----  A1C has remained about the same at 6.6.

## 2019-08-15 NOTE — PROGRESS NOTES
Subjective   Connie Lopez is a 38 y.o. female.     She presents today for her routine follow-up on chronic medical problems.  She reports that she has been doing fairly well since her last office visit with me.  She did decrease her Lexapro dosage to 10 mg because she felt like she was gaining weight.  She reports that the 10 mg dosing is not helping.  She also does not feel like the Wellbutrin currently is helping with her symptoms, however, she is not sure she is actually taking this.  She has complaints of chronic abdominal pain and uncontrolled acid reflux.  She is taking Prilosec for the symptoms.  She reports that she is not currently working.  She is taking her thyroid medication as prescribed.  Her blood pressure is normal today in the office.  She is otherwise without any other new complaints today in the office.      Thyroid Problem   Presents for follow-up visit. Symptoms include anxiety, depressed mood and fatigue. Patient reports no cold intolerance, constipation, diaphoresis, diarrhea, dry skin, hair loss, heat intolerance, hoarse voice, leg swelling, menstrual problem, nail problem, palpitations, tremors, visual change, weight gain or weight loss. The symptoms have been stable.   Anxiety   Presents for follow-up visit. Symptoms include decreased concentration, depressed mood, excessive worry, irritability, nervous/anxious behavior and restlessness. Patient reports no chest pain, compulsions, dizziness, dry mouth, feeling of choking, hyperventilation, impotence, insomnia, malaise, muscle tension, nausea, obsessions, palpitations, panic, shortness of breath or suicidal ideas. Symptoms occur constantly. The severity of symptoms is moderate. The quality of sleep is good.     Her past medical history is significant for depression. Compliance with medications is %.   Depression   Visit Type: follow-up  Patient presents with the following symptoms: decreased concentration, depressed mood,  excessive worry, irritability, nervousness/anxiety and restlessness.  Patient is not experiencing: anhedonia, chest pain, choking sensation, compulsions, dizziness, dry mouth, fatigue, feelings of hopelessness, feelings of worthlessness, hypersomnia, hyperventilation, impotence, insomnia, malaise, memory impairment, muscle tension, nausea, obsessions, palpitations, panic, psychomotor agitation, psychomotor retardation, shortness of breath, suicidal ideas, suicidal planning, thoughts of death, weight gain and weight loss.  Frequency of symptoms: most days   Severity: interfering with daily activities   Sleep quality: fair  Compliance with medications:  %             The following portions of the patient's history were reviewed and updated as appropriate: allergies, current medications, past family history, past medical history, past social history, past surgical history and problem list.    Review of Systems   Constitutional: Positive for fatigue and irritability. Negative for diaphoresis, fever, unexpected weight gain and unexpected weight loss.   HENT: Negative.  Negative for hoarse voice.    Eyes: Negative.    Respiratory: Negative.  Negative for choking and shortness of breath.    Cardiovascular: Negative.  Negative for chest pain and palpitations.   Gastrointestinal: Positive for abdominal pain, GERD and indigestion. Negative for abdominal distention, constipation, diarrhea, nausea and vomiting.   Endocrine: Negative for cold intolerance and heat intolerance.   Genitourinary: Negative for dysuria, frequency, hematuria, impotence, menstrual problem, pelvic pain and urinary incontinence.   Musculoskeletal: Positive for back pain.   Skin: Negative.  Negative for dry skin.   Allergic/Immunologic: Negative.    Neurological: Negative.  Negative for dizziness, tremors, weakness and numbness.   Hematological: Negative.    Psychiatric/Behavioral: Positive for decreased concentration, depressed mood and stress.  Negative for suicidal ideas. The patient is nervous/anxious. The patient does not have insomnia.        Objective   Physical Exam   Constitutional: She is oriented to person, place, and time. Vital signs are normal. She appears well-developed and well-nourished. No distress. She is obese.  HENT:   Head: Normocephalic.   Right Ear: External ear normal.   Left Ear: External ear normal.   Nose: Nose normal.   Mouth/Throat: Oropharynx is clear and moist. No oropharyngeal exudate.   Eyes: Conjunctivae and EOM are normal. Pupils are equal, round, and reactive to light. Right eye exhibits no discharge. Left eye exhibits no discharge.   Neck: Normal range of motion. Neck supple. No tracheal deviation present. No thyromegaly present.   Cardiovascular: Normal rate, regular rhythm and normal heart sounds. Exam reveals no gallop and no friction rub.   No murmur heard.  Pulmonary/Chest: Effort normal and breath sounds normal. No respiratory distress. She has no wheezes. She has no rales. She exhibits no tenderness.   Musculoskeletal: Normal range of motion.   Lymphadenopathy:     She has no cervical adenopathy.   Neurological: She is alert and oriented to person, place, and time.   Skin: Skin is warm and dry. Capillary refill takes less than 2 seconds. No rash noted. She is not diaphoretic. No erythema. No pallor.   Psychiatric: She has a normal mood and affect. Her behavior is normal. Judgment and thought content normal.   Nursing note and vitals reviewed.        Assessment/Plan   Connie was seen today for follow-up and anxiety.    Diagnoses and all orders for this visit:    Heartburn  -     Ambulatory Referral to Gastroenterology    Upper abdominal pain  -     Ambulatory Referral to Gastroenterology    Anxiety  -     buPROPion XL (WELLBUTRIN XL) 300 MG 24 hr tablet; Take 1 tablet by mouth Daily.  -     escitalopram (LEXAPRO) 20 MG tablet; Take 1 tablet by mouth Daily.    Essential hypertension    Morbidly obese  (CMS/HCC)    Hyperthyroidism  -     TSH  -     T3, Free  -     T4, Free    Hyperglycemia  -     Comprehensive Metabolic Panel  -     Hemoglobin A1c    Severe episode of recurrent major depressive disorder, without psychotic features (CMS/HCC)    Abnormal kidney function  -     Comprehensive Metabolic Panel               Patient's Body mass index is 39.89 kg/m². BMI is above normal parameters. Recommendations include: educational material.  Lab following office visit.  Increase Wellbutrin dosage to 300 mg in the morning.  Referral to GI for chronic abdominal pain and uncontrolled heartburn.  Continue all other current medications.  Follow up in 6 weeks for routine follow up.  Follow up sooner for problems/concerns.  Patient verbalized understanding and agreement with plan of care.        This document has been electronically signed by ROSSY Matthews on August 15, 2019 8:16 AM

## 2019-08-23 ENCOUNTER — TELEPHONE (OUTPATIENT)
Dept: FAMILY MEDICINE CLINIC | Facility: CLINIC | Age: 39
End: 2019-08-23

## 2019-08-26 DIAGNOSIS — F41.9 ANXIETY: ICD-10-CM

## 2019-08-26 DIAGNOSIS — E05.90 HYPERTHYROIDISM: Chronic | ICD-10-CM

## 2019-08-26 RX ORDER — METHIMAZOLE 5 MG/1
5 TABLET ORAL DAILY
Qty: 30 TABLET | Refills: 5 | Status: SHIPPED | OUTPATIENT
Start: 2019-08-26 | End: 2021-04-10 | Stop reason: SDUPTHER

## 2019-08-26 RX ORDER — ESCITALOPRAM OXALATE 20 MG/1
20 TABLET ORAL DAILY
Qty: 91 TABLET | Refills: 1 | Status: SHIPPED | OUTPATIENT
Start: 2019-08-26 | End: 2019-10-29 | Stop reason: SDUPTHER

## 2019-10-02 ENCOUNTER — TELEPHONE (OUTPATIENT)
Dept: FAMILY MEDICINE CLINIC | Facility: CLINIC | Age: 39
End: 2019-10-02

## 2019-10-02 NOTE — TELEPHONE ENCOUNTER
Patient left voicemail for someone to giver her a call back to make an appointment. I returned her call but no answer.

## 2019-10-03 ENCOUNTER — TELEPHONE (OUTPATIENT)
Dept: FAMILY MEDICINE CLINIC | Facility: CLINIC | Age: 39
End: 2019-10-03

## 2019-10-03 NOTE — TELEPHONE ENCOUNTER
Pt wanted to know if she needed a referral to the [facility] advised Pt to call their office to see of the walkin clinic  Is available.

## 2019-10-14 ENCOUNTER — TELEPHONE (OUTPATIENT)
Dept: FAMILY MEDICINE CLINIC | Facility: CLINIC | Age: 39
End: 2019-10-14

## 2019-10-29 ENCOUNTER — OFFICE VISIT (OUTPATIENT)
Dept: FAMILY MEDICINE CLINIC | Facility: CLINIC | Age: 39
End: 2019-10-29

## 2019-10-29 VITALS
OXYGEN SATURATION: 96 % | WEIGHT: 254.6 LBS | RESPIRATION RATE: 20 BRPM | DIASTOLIC BLOOD PRESSURE: 80 MMHG | TEMPERATURE: 99 F | BODY MASS INDEX: 39.96 KG/M2 | SYSTOLIC BLOOD PRESSURE: 120 MMHG | HEART RATE: 82 BPM | HEIGHT: 67 IN

## 2019-10-29 DIAGNOSIS — R10.9 CHRONIC ABDOMINAL PAIN: ICD-10-CM

## 2019-10-29 DIAGNOSIS — E05.90 HYPERTHYROIDISM: Chronic | ICD-10-CM

## 2019-10-29 DIAGNOSIS — R73.9 HYPERGLYCEMIA: ICD-10-CM

## 2019-10-29 DIAGNOSIS — E55.9 VITAMIN D DEFICIENCY: Chronic | ICD-10-CM

## 2019-10-29 DIAGNOSIS — F41.9 ANXIETY: ICD-10-CM

## 2019-10-29 DIAGNOSIS — I10 ESSENTIAL HYPERTENSION: Primary | Chronic | ICD-10-CM

## 2019-10-29 DIAGNOSIS — E66.01 MORBIDLY OBESE (HCC): ICD-10-CM

## 2019-10-29 DIAGNOSIS — G89.29 CHRONIC ABDOMINAL PAIN: ICD-10-CM

## 2019-10-29 PROCEDURE — 99214 OFFICE O/P EST MOD 30 MIN: CPT | Performed by: NURSE PRACTITIONER

## 2019-10-29 RX ORDER — DEXLANSOPRAZOLE 60 MG/1
CAPSULE, DELAYED RELEASE ORAL
COMMUNITY
End: 2019-10-29

## 2019-10-29 RX ORDER — TRIAMTERENE AND HYDROCHLOROTHIAZIDE 75; 50 MG/1; MG/1
1 TABLET ORAL DAILY
Qty: 91 TABLET | Refills: 1 | Status: SHIPPED | OUTPATIENT
Start: 2019-10-29

## 2019-10-29 RX ORDER — OMEPRAZOLE 40 MG/1
40 CAPSULE, DELAYED RELEASE ORAL DAILY
Qty: 91 CAPSULE | Refills: 1 | Status: SHIPPED | OUTPATIENT
Start: 2019-10-29

## 2019-10-29 RX ORDER — ESCITALOPRAM OXALATE 20 MG/1
20 TABLET ORAL DAILY
Qty: 91 TABLET | Refills: 1 | Status: SHIPPED | OUTPATIENT
Start: 2019-10-29 | End: 2021-03-09

## 2019-10-29 RX ORDER — BUPROPION HYDROCHLORIDE 300 MG/1
300 TABLET ORAL DAILY
Qty: 91 TABLET | Refills: 1 | Status: SHIPPED | OUTPATIENT
Start: 2019-10-29 | End: 2021-03-09

## 2019-11-18 NOTE — PROGRESS NOTES
Subjective   Connie Lopez is a 39 y.o. female.     She presents today for her routine follow-up on chronic medical problems.  She reports that she has been doing fairly well since her last office visit with me.  She did decrease her Lexapro dosage to 10 mg because she felt like she was gaining weight.  She reports that the 10 mg dosing is not helping.  She also does not feel like the Wellbutrin currently is helping with her symptoms.  She would like to discuss changing the dosing on these medications.  She reports that her indigestion symptoms are currently well controlled on her Prilosec.  She is tolerating her medication to manage her thyroid condition without side effects.  She is due for routine fasting labs.  She also needs refills on all her routine daily medications.  She is without any other new complaints today in the office.      Thyroid Problem   Presents for follow-up visit. Symptoms include anxiety, depressed mood and fatigue. Patient reports no cold intolerance, constipation, diaphoresis, diarrhea, dry skin, hair loss, heat intolerance, hoarse voice, leg swelling, menstrual problem, nail problem, palpitations, tremors, visual change, weight gain or weight loss. The symptoms have been stable.   Anxiety   Presents for follow-up visit. Symptoms include decreased concentration, depressed mood, excessive worry, irritability, nervous/anxious behavior and restlessness. Patient reports no chest pain, compulsions, dizziness, dry mouth, feeling of choking, hyperventilation, impotence, insomnia, malaise, muscle tension, nausea, obsessions, palpitations, panic, shortness of breath or suicidal ideas. Symptoms occur constantly. The severity of symptoms is moderate. The quality of sleep is good.     Her past medical history is significant for depression. Compliance with medications is %.   Depression   Visit Type: follow-up  Patient presents with the following symptoms: decreased concentration,  depressed mood, excessive worry, irritability, nervousness/anxiety and restlessness.  Patient is not experiencing: anhedonia, chest pain, choking sensation, compulsions, dizziness, dry mouth, fatigue, feelings of hopelessness, feelings of worthlessness, hypersomnia, hyperventilation, impotence, insomnia, malaise, memory impairment, muscle tension, nausea, obsessions, palpitations, panic, psychomotor agitation, psychomotor retardation, shortness of breath, suicidal ideas, suicidal planning, thoughts of death, weight gain and weight loss.  Frequency of symptoms: most days   Severity: interfering with daily activities   Sleep quality: fair  Compliance with medications:  %             The following portions of the patient's history were reviewed and updated as appropriate: allergies, current medications, past family history, past medical history, past social history, past surgical history and problem list.    Review of Systems   Constitutional: Positive for fatigue and irritability. Negative for diaphoresis, fever, unexpected weight gain and unexpected weight loss.   HENT: Negative.  Negative for hoarse voice.    Eyes: Negative.    Respiratory: Negative.  Negative for choking and shortness of breath.    Cardiovascular: Negative.  Negative for chest pain and palpitations.   Gastrointestinal: Positive for abdominal pain, GERD and indigestion. Negative for abdominal distention, constipation, diarrhea, nausea and vomiting.   Endocrine: Negative for cold intolerance and heat intolerance.   Genitourinary: Negative for dysuria, frequency, hematuria, impotence, menstrual problem, pelvic pain and urinary incontinence.   Musculoskeletal: Positive for back pain.   Skin: Negative.  Negative for dry skin.   Allergic/Immunologic: Negative.    Neurological: Negative.  Negative for dizziness, tremors, weakness and numbness.   Hematological: Negative.    Psychiatric/Behavioral: Positive for decreased concentration, depressed mood  and stress. Negative for suicidal ideas. The patient is nervous/anxious. The patient does not have insomnia.        Objective   Physical Exam   Constitutional: She is oriented to person, place, and time. Vital signs are normal. She appears well-developed and well-nourished. No distress. She is morbidly obese.  HENT:   Head: Normocephalic.   Right Ear: External ear normal.   Left Ear: External ear normal.   Nose: Nose normal.   Mouth/Throat: Oropharynx is clear and moist. No oropharyngeal exudate.   Eyes: Conjunctivae and EOM are normal. Pupils are equal, round, and reactive to light. Right eye exhibits no discharge. Left eye exhibits no discharge.   Neck: Normal range of motion. Neck supple. No tracheal deviation present. No thyromegaly present.   Cardiovascular: Normal rate, regular rhythm and normal heart sounds. Exam reveals no gallop and no friction rub.   No murmur heard.  Pulmonary/Chest: Effort normal and breath sounds normal. No respiratory distress. She has no wheezes. She has no rales. She exhibits no tenderness.   Musculoskeletal: Normal range of motion.   Lymphadenopathy:     She has no cervical adenopathy.   Neurological: She is alert and oriented to person, place, and time.   Skin: Skin is warm and dry. Capillary refill takes less than 2 seconds. No rash noted. She is not diaphoretic. No erythema. No pallor.   Psychiatric: She has a normal mood and affect. Her behavior is normal. Judgment and thought content normal.   Nursing note and vitals reviewed.        Assessment/Plan   Connie was seen today for follow-up and anxiety.    Diagnoses and all orders for this visit:    Essential hypertension  -     CBC & Differential  -     Comprehensive Metabolic Panel  -     Hemoglobin A1c  -     Lipid Panel  -     TSH  -     Vitamin D 25 Hydroxy  -     T3, Free  -     T4, Free  -     triamterene-hydrochlorothiazide (MAXZIDE) 75-50 MG per tablet; Take 1 tablet by mouth Daily.    Vitamin D deficiency  -     CBC &  Differential  -     Comprehensive Metabolic Panel  -     Hemoglobin A1c  -     Lipid Panel  -     TSH  -     Vitamin D 25 Hydroxy  -     T3, Free  -     T4, Free    Hyperthyroidism  -     CBC & Differential  -     Comprehensive Metabolic Panel  -     Hemoglobin A1c  -     Lipid Panel  -     TSH  -     Vitamin D 25 Hydroxy  -     T3, Free  -     T4, Free    Anxiety  -     CBC & Differential  -     Comprehensive Metabolic Panel  -     Hemoglobin A1c  -     Lipid Panel  -     TSH  -     Vitamin D 25 Hydroxy  -     T3, Free  -     T4, Free  -     buPROPion XL (WELLBUTRIN XL) 300 MG 24 hr tablet; Take 1 tablet by mouth Daily.  -     escitalopram (LEXAPRO) 20 MG tablet; Take 1 tablet by mouth Daily.    Hyperglycemia  -     CBC & Differential  -     Comprehensive Metabolic Panel  -     Hemoglobin A1c  -     Lipid Panel  -     TSH  -     Vitamin D 25 Hydroxy  -     T3, Free  -     T4, Free    Morbidly obese (CMS/HCC)  -     CBC & Differential  -     Comprehensive Metabolic Panel  -     Hemoglobin A1c  -     Lipid Panel  -     TSH  -     Vitamin D 25 Hydroxy  -     T3, Free  -     T4, Free    Chronic abdominal pain  -     omeprazole (PrilOSEC) 40 MG capsule; Take 1 capsule by mouth Daily.               Patient's Body mass index is 39.88 kg/m². BMI is above normal parameters. Recommendations include: educational material.    Fasting labs.  Increase Prilosec to 40 mg once daily.  Change Wellbutrin dosage to 300 mg in the morning.  Continue all other current medications.  She has been discharged due to frequent no-shows.  Patient is encouraged to establish care with another primary care provider as soon as possible.  Follow up sooner for problems/concerns.  Patient verbalized understanding and agreement with plan of care.        This document has been electronically signed by ROSSY Matthews on November 17, 2019 7:09 PM

## 2021-03-09 ENCOUNTER — LAB (OUTPATIENT)
Dept: LAB | Facility: HOSPITAL | Age: 41
End: 2021-03-09

## 2021-03-09 ENCOUNTER — OFFICE VISIT (OUTPATIENT)
Dept: ENDOCRINOLOGY | Facility: CLINIC | Age: 41
End: 2021-03-09

## 2021-03-09 VITALS
DIASTOLIC BLOOD PRESSURE: 78 MMHG | OXYGEN SATURATION: 98 % | HEIGHT: 67 IN | BODY MASS INDEX: 39.02 KG/M2 | SYSTOLIC BLOOD PRESSURE: 120 MMHG | WEIGHT: 248.6 LBS | HEART RATE: 77 BPM

## 2021-03-09 DIAGNOSIS — E27.0 ADRENAL CORTEX HYPERFUNCTION (HCC): ICD-10-CM

## 2021-03-09 DIAGNOSIS — E05.90 HYPERTHYROIDISM: Primary | ICD-10-CM

## 2021-03-09 DIAGNOSIS — E55.9 VITAMIN D DEFICIENCY: ICD-10-CM

## 2021-03-09 DIAGNOSIS — I10 ESSENTIAL HYPERTENSION: ICD-10-CM

## 2021-03-09 LAB
25(OH)D3 SERPL-MCNC: 21.6 NG/ML (ref 30–100)
ALBUMIN SERPL-MCNC: 4.4 G/DL (ref 3.5–5.2)
ALBUMIN/GLOB SERPL: 1.3 G/DL
ALP SERPL-CCNC: 97 U/L (ref 39–117)
ALT SERPL W P-5'-P-CCNC: 11 U/L (ref 1–33)
ANION GAP SERPL CALCULATED.3IONS-SCNC: 8.6 MMOL/L (ref 5–15)
AST SERPL-CCNC: 13 U/L (ref 1–32)
BASOPHILS # BLD AUTO: 0.04 10*3/MM3 (ref 0–0.2)
BASOPHILS NFR BLD AUTO: 0.7 % (ref 0–1.5)
BILIRUB SERPL-MCNC: 0.6 MG/DL (ref 0–1.2)
BUN SERPL-MCNC: 15 MG/DL (ref 6–20)
BUN/CREAT SERPL: 14.9 (ref 7–25)
CALCIUM SPEC-SCNC: 10 MG/DL (ref 8.6–10.5)
CHLORIDE SERPL-SCNC: 105 MMOL/L (ref 98–107)
CO2 SERPL-SCNC: 28.4 MMOL/L (ref 22–29)
CORTIS SERPL-MCNC: 5.32 MCG/DL
CREAT SERPL-MCNC: 1.01 MG/DL (ref 0.57–1)
DEPRECATED RDW RBC AUTO: 41 FL (ref 37–54)
EOSINOPHIL # BLD AUTO: 0.13 10*3/MM3 (ref 0–0.4)
EOSINOPHIL NFR BLD AUTO: 2.1 % (ref 0.3–6.2)
ERYTHROCYTE [DISTWIDTH] IN BLOOD BY AUTOMATED COUNT: 13.1 % (ref 12.3–15.4)
GFR SERPL CREATININE-BSD FRML MDRD: 73 ML/MIN/1.73
GLOBULIN UR ELPH-MCNC: 3.4 GM/DL
GLUCOSE SERPL-MCNC: 83 MG/DL (ref 65–99)
HCT VFR BLD AUTO: 38.6 % (ref 34–46.6)
HGB BLD-MCNC: 13.1 G/DL (ref 12–15.9)
IMM GRANULOCYTES # BLD AUTO: 0.02 10*3/MM3 (ref 0–0.05)
IMM GRANULOCYTES NFR BLD AUTO: 0.3 % (ref 0–0.5)
LYMPHOCYTES # BLD AUTO: 2.73 10*3/MM3 (ref 0.7–3.1)
LYMPHOCYTES NFR BLD AUTO: 44.9 % (ref 19.6–45.3)
MCH RBC QN AUTO: 29 PG (ref 26.6–33)
MCHC RBC AUTO-ENTMCNC: 33.9 G/DL (ref 31.5–35.7)
MCV RBC AUTO: 85.4 FL (ref 79–97)
MONOCYTES # BLD AUTO: 0.54 10*3/MM3 (ref 0.1–0.9)
MONOCYTES NFR BLD AUTO: 8.9 % (ref 5–12)
NEUTROPHILS NFR BLD AUTO: 2.62 10*3/MM3 (ref 1.7–7)
NEUTROPHILS NFR BLD AUTO: 43.1 % (ref 42.7–76)
NRBC BLD AUTO-RTO: 0 /100 WBC (ref 0–0.2)
PLATELET # BLD AUTO: 225 10*3/MM3 (ref 140–450)
PMV BLD AUTO: 11.9 FL (ref 6–12)
POTASSIUM SERPL-SCNC: 4.3 MMOL/L (ref 3.5–5.2)
PROT SERPL-MCNC: 7.8 G/DL (ref 6–8.5)
RBC # BLD AUTO: 4.52 10*6/MM3 (ref 3.77–5.28)
SODIUM SERPL-SCNC: 142 MMOL/L (ref 136–145)
T3 SERPL-MCNC: 101 NG/DL (ref 80–200)
T4 FREE SERPL-MCNC: 0.88 NG/DL (ref 0.93–1.7)
TSH SERPL DL<=0.05 MIU/L-ACNC: 1.47 UIU/ML (ref 0.27–4.2)
WBC # BLD AUTO: 6.08 10*3/MM3 (ref 3.4–10.8)

## 2021-03-09 PROCEDURE — 82024 ASSAY OF ACTH: CPT | Performed by: NURSE PRACTITIONER

## 2021-03-09 PROCEDURE — 82533 TOTAL CORTISOL: CPT | Performed by: NURSE PRACTITIONER

## 2021-03-09 PROCEDURE — 82306 VITAMIN D 25 HYDROXY: CPT | Performed by: NURSE PRACTITIONER

## 2021-03-09 PROCEDURE — 84445 ASSAY OF TSI GLOBULIN: CPT | Performed by: NURSE PRACTITIONER

## 2021-03-09 PROCEDURE — 99214 OFFICE O/P EST MOD 30 MIN: CPT | Performed by: NURSE PRACTITIONER

## 2021-03-09 PROCEDURE — 84480 ASSAY TRIIODOTHYRONINE (T3): CPT | Performed by: NURSE PRACTITIONER

## 2021-03-09 PROCEDURE — 85025 COMPLETE CBC W/AUTO DIFF WBC: CPT | Performed by: NURSE PRACTITIONER

## 2021-03-09 PROCEDURE — 84439 ASSAY OF FREE THYROXINE: CPT | Performed by: NURSE PRACTITIONER

## 2021-03-09 PROCEDURE — 36415 COLL VENOUS BLD VENIPUNCTURE: CPT | Performed by: NURSE PRACTITIONER

## 2021-03-09 PROCEDURE — 86376 MICROSOMAL ANTIBODY EACH: CPT | Performed by: NURSE PRACTITIONER

## 2021-03-09 PROCEDURE — 83520 IMMUNOASSAY QUANT NOS NONAB: CPT | Performed by: NURSE PRACTITIONER

## 2021-03-09 PROCEDURE — 80053 COMPREHEN METABOLIC PANEL: CPT | Performed by: NURSE PRACTITIONER

## 2021-03-09 PROCEDURE — 82627 DEHYDROEPIANDROSTERONE: CPT | Performed by: NURSE PRACTITIONER

## 2021-03-09 PROCEDURE — 84443 ASSAY THYROID STIM HORMONE: CPT | Performed by: NURSE PRACTITIONER

## 2021-03-09 RX ORDER — BACLOFEN 5 MG/1
TABLET ORAL EVERY 8 HOURS SCHEDULED
COMMUNITY

## 2021-03-09 RX ORDER — NAPROXEN 500 MG/1
500 TABLET ORAL 2 TIMES DAILY PRN
COMMUNITY
Start: 2021-01-04

## 2021-03-09 RX ORDER — LURASIDONE HYDROCHLORIDE 60 MG/1
60 TABLET, FILM COATED ORAL DAILY
COMMUNITY
Start: 2021-03-03

## 2021-03-09 NOTE — PROGRESS NOTES
"Chief Complaint  Hypothyroidism (New patient)    Subjective          Connie Lopez presents to [facility] ENDOCRINOLOGY  History of Present Illness       In office visit       Referring provider       40 year old female presents for consultation     REASON -- Hyperthyroidism     Duration -- since 2014    Timing constant     Quality controlled    Severity moderate     Location/size     Thyroid     No ultrasound    Lab Results   Component Value Date    TSH 0.563 07/30/2019       Current medications --- methimazole       alleviating factors -- compliance with medication    Aggravating factors -- none       She states needs her adrenal glands need to be looked at     There are no CT adrenal and no labs     She denies weight gain, she has hypertension but controlled      Objective   Vital Signs:   /78   Pulse 77   Ht 170.2 cm (67\")   Wt 113 kg (248 lb 9.6 oz)   SpO2 98%   BMI 38.94 kg/m²     Physical Exam  Constitutional:       Appearance: Normal appearance.   HENT:      Head: Normocephalic.      Right Ear: External ear normal.      Left Ear: External ear normal.      Nose: Nose normal.   Eyes:      Pupils: Pupils are equal, round, and reactive to light.   Cardiovascular:      Rate and Rhythm: Regular rhythm.      Heart sounds: Normal heart sounds.   Pulmonary:      Breath sounds: Normal breath sounds.   Musculoskeletal:         General: Normal range of motion.      Cervical back: Normal range of motion and neck supple.   Skin:     Coloration: Skin is not pale.   Neurological:      General: No focal deficit present.      Mental Status: She is alert.   Psychiatric:         Mood and Affect: Mood normal.         Thought Content: Thought content normal.         Judgment: Judgment normal.        Result Review :   The following data was reviewed by: ROSSY Walton on 03/09/2021:                Assessment and Plan    Diagnoses and all orders for this visit:    1. " Hyperthyroidism (Primary)  -     CBC & Differential  -     Comprehensive Metabolic Panel  -     TSH  -     Vitamin D 25 Hydroxy  -     T4, Free  -     Thyroid Peroxidase Antibody  -     Thyroid Stimulating Immunoglobulin  -     Thyrotropin Receptor Antibody  -     Cancel: TSH  -     T3  -     ACTH  -     DHEA-Sulfate  -     Cortisol    2. Vitamin D deficiency  -     CBC & Differential  -     Comprehensive Metabolic Panel  -     TSH  -     Vitamin D 25 Hydroxy  -     T4, Free  -     Thyroid Peroxidase Antibody  -     Thyroid Stimulating Immunoglobulin  -     Thyrotropin Receptor Antibody  -     Cancel: TSH  -     T3  -     ACTH  -     DHEA-Sulfate  -     Cortisol    3. Adrenal cortex hyperfunction (CMS/HCC)  -     CBC & Differential  -     Comprehensive Metabolic Panel  -     TSH  -     Vitamin D 25 Hydroxy  -     T4, Free  -     Thyroid Peroxidase Antibody  -     Thyroid Stimulating Immunoglobulin  -     Thyrotropin Receptor Antibody  -     Cancel: TSH  -     T3  -     ACTH  -     DHEA-Sulfate  -     Cortisol    4. Essential hypertension           Summary Ms. Lopez is a 40-year-old female with a long history of hyperthyroidism has been referred for further management    Plan    Hyperthyroidism      Currently taking methimazole 5 mg once daily    Reassess TSH free T4 T3 today and TSI antibodies    She knows the side effects of methimazole    No change in dosage at this time    We will assess a random cortisol ACTH and DHEA-S today    I have no records of any adrenal adenoma    No abdominal pain no out-of-control hypertension and no diabetes    Hypertension    Currently controlled on Maxide 1 daily    Bone health    Vitamin D deficiency    Taking a multivitamin daily    Records received from Dr. De La Cruz from 2020 labs from 2020 reviewed    Thank you for this consultation        Follow Up   Return in about 4 months (around 7/9/2021) for Recheck.  Patient was given instructions and counseling regarding her condition  or for health maintenance advice. Please see specific information pulled into the AVS if appropriate.

## 2021-03-10 LAB
ACTH PLAS-MCNC: 18.6 PG/ML (ref 7.2–63.3)
DHEA-S SERPL-MCNC: 82.2 UG/DL (ref 57.3–279.2)
THYROPEROXIDASE AB SERPL-ACNC: <9 IU/ML (ref 0–34)

## 2021-03-11 LAB — TSH RECEP AB SER-ACNC: 1.43 IU/L (ref 0–1.75)

## 2021-03-12 LAB — TSI SER-ACNC: 0.19 IU/L (ref 0–0.55)

## 2021-04-08 ENCOUNTER — TELEPHONE (OUTPATIENT)
Dept: ENDOCRINOLOGY | Facility: CLINIC | Age: 41
End: 2021-04-08

## 2021-04-08 NOTE — TELEPHONE ENCOUNTER
Pt is needing a refill on methlmazole  [facility] pharmacy - [facility]  [phone]

## 2021-04-10 DIAGNOSIS — E05.90 HYPERTHYROIDISM: Chronic | ICD-10-CM

## 2021-04-10 RX ORDER — METHIMAZOLE 5 MG/1
5 TABLET ORAL DAILY
Qty: 90 TABLET | Refills: 3 | Status: SHIPPED | OUTPATIENT
Start: 2021-04-10 | End: 2021-04-11 | Stop reason: SDUPTHER

## 2021-04-11 DIAGNOSIS — E05.90 HYPERTHYROIDISM: Chronic | ICD-10-CM

## 2021-04-11 RX ORDER — METHIMAZOLE 5 MG/1
5 TABLET ORAL DAILY
Qty: 90 TABLET | Refills: 3 | Status: SHIPPED | OUTPATIENT
Start: 2021-04-11 | End: 2022-04-15 | Stop reason: SDUPTHER

## 2021-07-07 ENCOUNTER — TELEPHONE (OUTPATIENT)
Dept: ENDOCRINOLOGY | Facility: CLINIC | Age: 41
End: 2021-07-07

## 2021-07-07 DIAGNOSIS — E55.9 VITAMIN D DEFICIENCY: Primary | ICD-10-CM

## 2021-07-07 DIAGNOSIS — E05.90 HYPERTHYROIDISM: ICD-10-CM

## 2021-10-12 ENCOUNTER — OFFICE VISIT (OUTPATIENT)
Dept: ENDOCRINOLOGY | Facility: CLINIC | Age: 41
End: 2021-10-12

## 2021-10-12 ENCOUNTER — LAB (OUTPATIENT)
Dept: LAB | Facility: HOSPITAL | Age: 41
End: 2021-10-12

## 2021-10-12 VITALS
OXYGEN SATURATION: 98 % | DIASTOLIC BLOOD PRESSURE: 70 MMHG | HEIGHT: 67 IN | WEIGHT: 252.7 LBS | BODY MASS INDEX: 39.66 KG/M2 | HEART RATE: 74 BPM | SYSTOLIC BLOOD PRESSURE: 120 MMHG

## 2021-10-12 DIAGNOSIS — E05.90 HYPERTHYROIDISM: ICD-10-CM

## 2021-10-12 DIAGNOSIS — I10 ESSENTIAL HYPERTENSION: Chronic | ICD-10-CM

## 2021-10-12 DIAGNOSIS — E55.9 VITAMIN D DEFICIENCY: Primary | ICD-10-CM

## 2021-10-12 LAB
25(OH)D3 SERPL-MCNC: 36 NG/ML
ALBUMIN SERPL-MCNC: 4.2 G/DL (ref 3.5–5.2)
ALBUMIN/GLOB SERPL: 1.3 G/DL
ALP SERPL-CCNC: 95 U/L (ref 39–117)
ALT SERPL W P-5'-P-CCNC: 16 U/L (ref 1–33)
ANION GAP SERPL CALCULATED.3IONS-SCNC: 8.6 MMOL/L (ref 5–15)
AST SERPL-CCNC: 13 U/L (ref 1–32)
BASOPHILS # BLD AUTO: 0.04 10*3/MM3 (ref 0–0.2)
BASOPHILS NFR BLD AUTO: 0.6 % (ref 0–1.5)
BILIRUB SERPL-MCNC: 0.4 MG/DL (ref 0–1.2)
BUN SERPL-MCNC: 15 MG/DL (ref 6–20)
BUN/CREAT SERPL: 14 (ref 7–25)
CALCIUM SPEC-SCNC: 9.7 MG/DL (ref 8.6–10.5)
CHLORIDE SERPL-SCNC: 107 MMOL/L (ref 98–107)
CO2 SERPL-SCNC: 26.4 MMOL/L (ref 22–29)
CREAT SERPL-MCNC: 1.07 MG/DL (ref 0.57–1)
DEPRECATED RDW RBC AUTO: 42.5 FL (ref 37–54)
EOSINOPHIL # BLD AUTO: 0.2 10*3/MM3 (ref 0–0.4)
EOSINOPHIL NFR BLD AUTO: 3.1 % (ref 0.3–6.2)
ERYTHROCYTE [DISTWIDTH] IN BLOOD BY AUTOMATED COUNT: 13.3 % (ref 12.3–15.4)
GFR SERPL CREATININE-BSD FRML MDRD: 68 ML/MIN/1.73
GLOBULIN UR ELPH-MCNC: 3.3 GM/DL
GLUCOSE SERPL-MCNC: 83 MG/DL (ref 65–99)
HCT VFR BLD AUTO: 40.4 % (ref 34–46.6)
HGB BLD-MCNC: 12.9 G/DL (ref 12–15.9)
IMM GRANULOCYTES # BLD AUTO: 0.01 10*3/MM3 (ref 0–0.05)
IMM GRANULOCYTES NFR BLD AUTO: 0.2 % (ref 0–0.5)
LYMPHOCYTES # BLD AUTO: 2.6 10*3/MM3 (ref 0.7–3.1)
LYMPHOCYTES NFR BLD AUTO: 40.7 % (ref 19.6–45.3)
MCH RBC QN AUTO: 27.9 PG (ref 26.6–33)
MCHC RBC AUTO-ENTMCNC: 31.9 G/DL (ref 31.5–35.7)
MCV RBC AUTO: 87.3 FL (ref 79–97)
MONOCYTES # BLD AUTO: 0.56 10*3/MM3 (ref 0.1–0.9)
MONOCYTES NFR BLD AUTO: 8.8 % (ref 5–12)
NEUTROPHILS NFR BLD AUTO: 2.98 10*3/MM3 (ref 1.7–7)
NEUTROPHILS NFR BLD AUTO: 46.6 % (ref 42.7–76)
NRBC BLD AUTO-RTO: 0 /100 WBC (ref 0–0.2)
PLATELET # BLD AUTO: 218 10*3/MM3 (ref 140–450)
PMV BLD AUTO: 12.1 FL (ref 6–12)
POTASSIUM SERPL-SCNC: 4.6 MMOL/L (ref 3.5–5.2)
PROT SERPL-MCNC: 7.5 G/DL (ref 6–8.5)
RBC # BLD AUTO: 4.63 10*6/MM3 (ref 3.77–5.28)
SODIUM SERPL-SCNC: 142 MMOL/L (ref 136–145)
T3 SERPL-MCNC: 95.4 NG/DL (ref 80–200)
T4 FREE SERPL-MCNC: 0.84 NG/DL (ref 0.93–1.7)
TSH SERPL DL<=0.05 MIU/L-ACNC: 1.66 UIU/ML (ref 0.27–4.2)
WBC # BLD AUTO: 6.39 10*3/MM3 (ref 3.4–10.8)

## 2021-10-12 PROCEDURE — 85025 COMPLETE CBC W/AUTO DIFF WBC: CPT | Performed by: NURSE PRACTITIONER

## 2021-10-12 PROCEDURE — 84443 ASSAY THYROID STIM HORMONE: CPT | Performed by: NURSE PRACTITIONER

## 2021-10-12 PROCEDURE — 82306 VITAMIN D 25 HYDROXY: CPT | Performed by: NURSE PRACTITIONER

## 2021-10-12 PROCEDURE — 84480 ASSAY TRIIODOTHYRONINE (T3): CPT | Performed by: NURSE PRACTITIONER

## 2021-10-12 PROCEDURE — 84439 ASSAY OF FREE THYROXINE: CPT | Performed by: NURSE PRACTITIONER

## 2021-10-12 PROCEDURE — 99214 OFFICE O/P EST MOD 30 MIN: CPT | Performed by: NURSE PRACTITIONER

## 2021-10-12 PROCEDURE — 36415 COLL VENOUS BLD VENIPUNCTURE: CPT | Performed by: NURSE PRACTITIONER

## 2021-10-12 PROCEDURE — 80053 COMPREHEN METABOLIC PANEL: CPT | Performed by: NURSE PRACTITIONER

## 2021-10-12 NOTE — PROGRESS NOTES
"Chief Complaint  Hyperthyroidism    Subjective          Connie Lopez presents to [facility] ENDOCRINOLOGY  History of Present Illness     In office visit     41-year-old female presents for follow-up    Reason hypothyroidism    Duration since 2014          Lab Results   Component Value Date    TSH 1.470 03/09/2021       Timing constant    Severity is moderate           Location/size      Thyroid      No ultrasound       Current medications --- methimazole         alleviating factors -- compliance with medication     Aggravating factors -- none         Review of Systems - General ROS: negative                 Objective   Vital Signs:   /70   Pulse 74   Ht 170.2 cm (67\")   Wt 115 kg (252 lb 11.2 oz)   SpO2 98%   BMI 39.58 kg/m²     Physical Exam  Constitutional:       Appearance: Normal appearance.   Cardiovascular:      Rate and Rhythm: Regular rhythm.      Heart sounds: Normal heart sounds.   Pulmonary:      Breath sounds: Normal breath sounds.   Musculoskeletal:      Cervical back: Normal range of motion.   Neurological:      Mental Status: She is alert.        Result Review :   The following data was reviewed by: ROSSY Walton on 10/12/2021:  Common labs    Common Labsle 3/9/21 3/9/21    1041 1041   Glucose  83   BUN  15   Creatinine  1.01 (A)   eGFR African Am  73   Sodium  142   Potassium  4.3   Chloride  105   Calcium  10.0   Albumin  4.40   Total Bilirubin  0.6   Alkaline Phosphatase  97   AST (SGOT)  13   ALT (SGPT)  11   WBC 6.08    Hemoglobin 13.1    Hematocrit 38.6    Platelets 225    (A) Abnormal value                      Assessment and Plan    Diagnoses and all orders for this visit:    1. Vitamin D deficiency (Primary)  -     CBC & Differential  -     Comprehensive Metabolic Panel  -     TSH  -     Vitamin D 25 Hydroxy  -     T4, Free  -     T3    2. Hyperthyroidism  -     CBC & Differential  -     Comprehensive Metabolic " Panel  -     TSH  -     Vitamin D 25 Hydroxy  -     T4, Free  -     T3    3. Essential hypertension                  Hyperthyroidism        Currently taking methimazole 5 mg once daily            She knows the side effects of methimazole      Component      Latest Ref Rng & Units 3/9/2021   Thyroid Peroxidase Antibody      0 - 34 IU/mL <9   Thyroid Stimulating Immunoglobulin      0.00 - 0.55 IU/L 0.19   Thyrotropin Receptor Antibody      0.00 - 1.75 IU/L 1.43               Hypertension     Currently controlled on Maxide 1 daily     Bone health     Vitamin D deficiency     Taking a multivitamin daily     Component      Latest Ref Rng & Units 3/9/2021   25 Hydroxy, Vitamin D      30.0 - 100.0 ng/ml 21.6 (L)             Follow Up   Return in about 6 months (around 4/12/2022) for Recheck.  Patient was given instructions and counseling regarding her condition or for health maintenance advice. Please see specific information pulled into the AVS if appropriate.         This document has been electronically signed by ROSSY Walton on October 12, 2021 10:58 CDT.

## 2021-10-13 ENCOUNTER — TELEPHONE (OUTPATIENT)
Dept: ENDOCRINOLOGY | Facility: CLINIC | Age: 41
End: 2021-10-13

## 2022-04-12 ENCOUNTER — OFFICE VISIT (OUTPATIENT)
Dept: ENDOCRINOLOGY | Facility: CLINIC | Age: 42
End: 2022-04-12

## 2022-04-12 ENCOUNTER — LAB (OUTPATIENT)
Dept: LAB | Facility: HOSPITAL | Age: 42
End: 2022-04-12

## 2022-04-12 VITALS
DIASTOLIC BLOOD PRESSURE: 70 MMHG | BODY MASS INDEX: 40.93 KG/M2 | SYSTOLIC BLOOD PRESSURE: 120 MMHG | OXYGEN SATURATION: 99 % | WEIGHT: 254.7 LBS | HEART RATE: 115 BPM | HEIGHT: 66 IN

## 2022-04-12 DIAGNOSIS — I10 ESSENTIAL HYPERTENSION: Chronic | ICD-10-CM

## 2022-04-12 DIAGNOSIS — E05.90 HYPERTHYROIDISM: Primary | ICD-10-CM

## 2022-04-12 DIAGNOSIS — E55.9 VITAMIN D DEFICIENCY: ICD-10-CM

## 2022-04-12 LAB
ALBUMIN SERPL-MCNC: 4.1 G/DL (ref 3.5–5.2)
ALBUMIN/GLOB SERPL: 1.2 G/DL
ALP SERPL-CCNC: 120 U/L (ref 39–117)
ALT SERPL W P-5'-P-CCNC: 14 U/L (ref 1–33)
ANION GAP SERPL CALCULATED.3IONS-SCNC: 9 MMOL/L (ref 5–15)
AST SERPL-CCNC: 15 U/L (ref 1–32)
BASOPHILS # BLD AUTO: 0.03 10*3/MM3 (ref 0–0.2)
BASOPHILS NFR BLD AUTO: 0.4 % (ref 0–1.5)
BILIRUB SERPL-MCNC: 0.5 MG/DL (ref 0–1.2)
BUN SERPL-MCNC: 13 MG/DL (ref 6–20)
BUN/CREAT SERPL: 10.8 (ref 7–25)
CALCIUM SPEC-SCNC: 9 MG/DL (ref 8.6–10.5)
CHLORIDE SERPL-SCNC: 105 MMOL/L (ref 98–107)
CO2 SERPL-SCNC: 27 MMOL/L (ref 22–29)
CREAT SERPL-MCNC: 1.2 MG/DL (ref 0.57–1)
DEPRECATED RDW RBC AUTO: 43.5 FL (ref 37–54)
EGFRCR SERPLBLD CKD-EPI 2021: 57.7 ML/MIN/1.73
EOSINOPHIL # BLD AUTO: 0.21 10*3/MM3 (ref 0–0.4)
EOSINOPHIL NFR BLD AUTO: 2.9 % (ref 0.3–6.2)
ERYTHROCYTE [DISTWIDTH] IN BLOOD BY AUTOMATED COUNT: 13.7 % (ref 12.3–15.4)
GLOBULIN UR ELPH-MCNC: 3.3 GM/DL
GLUCOSE SERPL-MCNC: 112 MG/DL (ref 65–99)
HCT VFR BLD AUTO: 39.5 % (ref 34–46.6)
HGB BLD-MCNC: 13 G/DL (ref 12–15.9)
IMM GRANULOCYTES # BLD AUTO: 0.02 10*3/MM3 (ref 0–0.05)
IMM GRANULOCYTES NFR BLD AUTO: 0.3 % (ref 0–0.5)
LYMPHOCYTES # BLD AUTO: 2.58 10*3/MM3 (ref 0.7–3.1)
LYMPHOCYTES NFR BLD AUTO: 35.1 % (ref 19.6–45.3)
MCH RBC QN AUTO: 28.6 PG (ref 26.6–33)
MCHC RBC AUTO-ENTMCNC: 32.9 G/DL (ref 31.5–35.7)
MCV RBC AUTO: 87 FL (ref 79–97)
MONOCYTES # BLD AUTO: 0.6 10*3/MM3 (ref 0.1–0.9)
MONOCYTES NFR BLD AUTO: 8.2 % (ref 5–12)
NEUTROPHILS NFR BLD AUTO: 3.92 10*3/MM3 (ref 1.7–7)
NEUTROPHILS NFR BLD AUTO: 53.1 % (ref 42.7–76)
NRBC BLD AUTO-RTO: 0 /100 WBC (ref 0–0.2)
PLATELET # BLD AUTO: 197 10*3/MM3 (ref 140–450)
PMV BLD AUTO: 12.1 FL (ref 6–12)
POTASSIUM SERPL-SCNC: 4.3 MMOL/L (ref 3.5–5.2)
PROT SERPL-MCNC: 7.4 G/DL (ref 6–8.5)
RBC # BLD AUTO: 4.54 10*6/MM3 (ref 3.77–5.28)
SODIUM SERPL-SCNC: 141 MMOL/L (ref 136–145)
WBC NRBC COR # BLD: 7.36 10*3/MM3 (ref 3.4–10.8)

## 2022-04-12 PROCEDURE — 84443 ASSAY THYROID STIM HORMONE: CPT | Performed by: NURSE PRACTITIONER

## 2022-04-12 PROCEDURE — 82306 VITAMIN D 25 HYDROXY: CPT | Performed by: NURSE PRACTITIONER

## 2022-04-12 PROCEDURE — 85025 COMPLETE CBC W/AUTO DIFF WBC: CPT | Performed by: NURSE PRACTITIONER

## 2022-04-12 PROCEDURE — 84480 ASSAY TRIIODOTHYRONINE (T3): CPT | Performed by: NURSE PRACTITIONER

## 2022-04-12 PROCEDURE — 80053 COMPREHEN METABOLIC PANEL: CPT | Performed by: NURSE PRACTITIONER

## 2022-04-12 PROCEDURE — 84439 ASSAY OF FREE THYROXINE: CPT | Performed by: NURSE PRACTITIONER

## 2022-04-12 PROCEDURE — 36415 COLL VENOUS BLD VENIPUNCTURE: CPT | Performed by: NURSE PRACTITIONER

## 2022-04-12 PROCEDURE — 99214 OFFICE O/P EST MOD 30 MIN: CPT | Performed by: NURSE PRACTITIONER

## 2022-04-12 NOTE — PROGRESS NOTES
"Chief Complaint  Thyroid Problem    Subjective          Connie Lopez presents to [facility] ENDOCRINOLOGY  History of Present Illness     In office visit      41-year-old female presents for follow-up     Reason hypothyroidism     Duration since 2014            Timing constant     Severity is moderate              Location/size      Thyroid      No ultrasound        Current medications --- methimazole         alleviating factors -- compliance with medication         Review of Systems - General ROS: negative          Objective   Vital Signs:   /70   Pulse 115   Ht 167.6 cm (66\")   Wt 116 kg (254 lb 11.2 oz)   SpO2 99%   BMI 41.11 kg/m²     Physical Exam  Constitutional:       Appearance: Normal appearance.   Cardiovascular:      Rate and Rhythm: Regular rhythm.      Heart sounds: Normal heart sounds.   Pulmonary:      Breath sounds: Normal breath sounds.   Musculoskeletal:         General: Normal range of motion.      Cervical back: Normal range of motion and neck supple.   Neurological:      Mental Status: She is alert.        Result Review :   The following data was reviewed by: ROSSY Walton on 04/12/2022:  Common labs    Common Labsle 10/12/21 10/12/21    1135 1135   Glucose  83   BUN  15   Creatinine  1.07 (A)   eGFR African Am  68   Sodium  142   Potassium  4.6   Chloride  107   Calcium  9.7   Albumin  4.20   Total Bilirubin  0.4   Alkaline Phosphatase  95   AST (SGOT)  13   ALT (SGPT)  16   WBC 6.39    Hemoglobin 12.9    Hematocrit 40.4    Platelets 218    (A) Abnormal value                      Assessment and Plan    Diagnoses and all orders for this visit:    1. Hyperthyroidism (Primary)  -     CBC & Differential  -     Comprehensive Metabolic Panel  -     TSH  -     Vitamin D 25 Hydroxy  -     T4, Free  -     T3    2. Vitamin D deficiency  -     CBC & Differential  -     Comprehensive Metabolic Panel  -     TSH  -     Vitamin D " 25 Hydroxy  -     T4, Free  -     T3    3. Essential hypertension           Hyperthyroidism        Currently taking methimazole 5 mg once daily              She knows the side effects of methimazole        Component      Latest Ref Rng & Units 3/9/2021   Thyroid Peroxidase Antibody      0 - 34 IU/mL <9   Thyroid Stimulating Immunoglobulin      0.00 - 0.55 IU/L 0.19   Thyrotropin Receptor Antibody      0.00 - 1.75 IU/L 1.43                  Hypertension      controlled on Maxide 1 daily             Bone health     Vitamin D deficiency     Taking a multivitamin daily     Component      Latest Ref Rng & Units 3/9/2021   25 Hydroxy, Vitamin D      30.0 - 100.0 ng/ml 21.6 (L)                         Follow Up   Return in about 6 months (around 10/12/2022) for Recheck.  Patient was given instructions and counseling regarding her condition or for health maintenance advice. Please see specific information pulled into the AVS if appropriate.         This document has been electronically signed by ROSSY Walton on April 12, 2022 10:44 CDT.

## 2022-04-13 LAB
25(OH)D3 SERPL-MCNC: 29.3 NG/ML (ref 30–100)
T3 SERPL-MCNC: 101 NG/DL (ref 80–200)
T4 FREE SERPL-MCNC: 0.73 NG/DL (ref 0.93–1.7)
TSH SERPL DL<=0.05 MIU/L-ACNC: 3.91 UIU/ML (ref 0.27–4.2)

## 2022-04-15 ENCOUNTER — TELEPHONE (OUTPATIENT)
Dept: ENDOCRINOLOGY | Facility: CLINIC | Age: 42
End: 2022-04-15

## 2022-04-15 DIAGNOSIS — E05.90 HYPERTHYROIDISM: Chronic | ICD-10-CM

## 2022-04-15 RX ORDER — METHIMAZOLE 5 MG/1
5 TABLET ORAL DAILY
Qty: 90 TABLET | Refills: 3 | Status: SHIPPED | OUTPATIENT
Start: 2022-04-15 | End: 2022-10-17 | Stop reason: SDUPTHER

## 2022-04-15 NOTE — TELEPHONE ENCOUNTER
Pt needs  methIMAzole (TAPAZOLE) 5 MG tablet sent to [facility] in [place]. Pt is completely out. Thank you

## 2022-10-17 ENCOUNTER — TELEMEDICINE (OUTPATIENT)
Dept: ENDOCRINOLOGY | Facility: CLINIC | Age: 42
End: 2022-10-17

## 2022-10-17 DIAGNOSIS — I10 PRIMARY HYPERTENSION: ICD-10-CM

## 2022-10-17 DIAGNOSIS — E55.9 VITAMIN D DEFICIENCY: ICD-10-CM

## 2022-10-17 DIAGNOSIS — E05.90 HYPERTHYROIDISM: Primary | ICD-10-CM

## 2022-10-17 PROCEDURE — 99214 OFFICE O/P EST MOD 30 MIN: CPT | Performed by: NURSE PRACTITIONER

## 2022-10-17 RX ORDER — METHIMAZOLE 5 MG/1
5 TABLET ORAL DAILY
Qty: 90 TABLET | Refills: 3 | Status: SHIPPED | OUTPATIENT
Start: 2022-10-17 | End: 2023-01-27 | Stop reason: SDUPTHER

## 2022-10-17 NOTE — PROGRESS NOTES
Chief Complaint  Thyroid Problem    Subjective          Connie Lopez presents to [facility] ENDOCRINOLOGY  History of Present Illness       You have chosen to receive care through a telehealth visit.  Do you consent to use a video/audio connection for your medical care today? Yes              TELEHEALTH VIDEO VISIT     This a video visit due to [facility] current guidelines for social distancing due to the COVID 19 pandemic      Mode of Visit: Video  Location of patient: home  You have chosen to receive care through a telehealth visit.  Does the patient consent to use a video/audio connection for your medical care today? Yes  The visit included audio and video interaction. No technical issues occurred during this visit.       42 year old female presents for follow up      Reason hypothyroidism     Duration since 2014            Timing constant     Severity is moderate              Location/size      Thyroid      No ultrasound        Current medications --- methimazole         alleviating factors -- compliance with medication       Review of Systems - General ROS: positive for  - fatigue          Objective   Vital Signs:   There were no vitals taken for this visit.    Physical Exam  Neurological:      General: No focal deficit present.      Mental Status: She is alert.   Psychiatric:         Mood and Affect: Mood normal.         Thought Content: Thought content normal.         Judgment: Judgment normal.        Result Review :   The following data was reviewed by: ROSSY Walton on 04/12/2022:  Common labs    Common Labs 4/12/22 4/12/22    1109 1109   Glucose  112 (A)   BUN  13   Creatinine  1.20 (A)   Sodium  141   Potassium  4.3   Chloride  105   Calcium  9.0   Albumin  4.10   Total Bilirubin  0.5   Alkaline Phosphatase  120 (A)   AST (SGOT)  15   ALT (SGPT)  14   WBC 7.36    Hemoglobin 13.0    Hematocrit 39.5    Platelets 197    (A) Abnormal value                         Assessment and Plan    Diagnoses and all orders for this visit:    1. Hyperthyroidism (Primary)  -     CBC & Differential  -     Comprehensive Metabolic Panel  -     TSH  -     T4, Free  -     T3  -     methIMAzole (TAPAZOLE) 5 MG tablet; Take 1 tablet by mouth Daily.  Dispense: 90 tablet; Refill: 3    2. Primary hypertension    3. Vitamin D deficiency           Hyperthyroidism        Currently taking methimazole 5 mg once daily              She knows the side effects of methimazole        Component      Latest Ref Rng & Units 3/9/2021   Thyroid Peroxidase Antibody      0 - 34 IU/mL <9   Thyroid Stimulating Immunoglobulin      0.00 - 0.55 IU/L 0.19   Thyrotropin Receptor Antibody      0.00 - 1.75 IU/L 1.43            Lab Results   Component Value Date    TSH 3.910 04/12/2022           Hypertension      controlled on Maxide 1 daily             Bone health     Vitamin D deficiency     Taking a multivitamin daily     Component      Latest Ref Rng & Units 3/9/2021   25 Hydroxy, Vitamin D      30.0 - 100.0 ng/ml 21.6 (L)                         Follow Up   No follow-ups on file.  Patient was given instructions and counseling regarding her condition or for health maintenance advice. Please see specific information pulled into the AVS if appropriate.         This document has been electronically signed by ROSSY Walton on October 17, 2022 10:43 CDT.

## 2023-01-27 ENCOUNTER — TELEMEDICINE (OUTPATIENT)
Dept: ENDOCRINOLOGY | Facility: CLINIC | Age: 43
End: 2023-01-27
Payer: COMMERCIAL

## 2023-01-27 DIAGNOSIS — E55.9 VITAMIN D DEFICIENCY: Primary | Chronic | ICD-10-CM

## 2023-01-27 DIAGNOSIS — I10 PRIMARY HYPERTENSION: ICD-10-CM

## 2023-01-27 DIAGNOSIS — E05.90 HYPERTHYROIDISM: Chronic | ICD-10-CM

## 2023-01-27 PROCEDURE — 99214 OFFICE O/P EST MOD 30 MIN: CPT | Performed by: NURSE PRACTITIONER

## 2023-01-27 RX ORDER — METHIMAZOLE 5 MG/1
5 TABLET ORAL DAILY
Qty: 90 TABLET | Refills: 3 | Status: SHIPPED | OUTPATIENT
Start: 2023-01-27

## 2023-01-27 NOTE — PROGRESS NOTES
Chief Complaint  No chief complaint on file.    Subjective          Connie Loepz presents to [facility] ENDOCRINOLOGY  History of Present Illness       You have chosen to receive care through a telehealth visit.  Do you consent to use a video/audio connection for your medical care today? Yes              TELEHEALTH VIDEO VISIT     This a video visit due to [facility] current guidelines for social distancing due to the COVID 19 pandemic      Mode of Visit: Video  Location of patient: home  You have chosen to receive care through a telehealth visit.  Does the patient consent to use a video/audio connection for your medical care today? Yes  The visit included audio and video interaction. No technical issues occurred during this visit.             42 year old female presents for follow up      Reason hypothyroidism     Duration since 2014            Timing constant     Severity is moderate          Location/size      Thyroid      No ultrasound        Current medications --- methimazole         alleviating factors -- compliance with medication         Review of Systems - General ROS: negative          Objective   Vital Signs:   There were no vitals taken for this visit.    Physical Exam  Neurological:      General: No focal deficit present.      Mental Status: She is alert.   Psychiatric:         Mood and Affect: Mood normal.         Thought Content: Thought content normal.         Judgment: Judgment normal.        Result Review :   The following data was reviewed by: ROSSY Walton on 04/12/2022:  Common labs    Common Labs 4/12/22 4/12/22    1109 1109   Glucose  112 (A)   BUN  13   Creatinine  1.20 (A)   Sodium  141   Potassium  4.3   Chloride  105   Calcium  9.0   Albumin  4.10   Total Bilirubin  0.5   Alkaline Phosphatase  120 (A)   AST (SGOT)  15   ALT (SGPT)  14   WBC 7.36    Hemoglobin 13.0    Hematocrit 39.5    Platelets 197    (A) Abnormal value                         Assessment and Plan    Diagnoses and all orders for this visit:    1. Vitamin D deficiency (Primary)  -     T4, Free  -     TSH  -     T3  -     CBC & Differential  -     Comprehensive Metabolic Panel  -     Vitamin D,25-Hydroxy    2. Hyperthyroidism  -     methIMAzole (TAPAZOLE) 5 MG tablet; Take 1 tablet by mouth Daily.  Dispense: 90 tablet; Refill: 3  -     T4, Free  -     TSH  -     T3  -     CBC & Differential  -     Comprehensive Metabolic Panel  -     Vitamin D,25-Hydroxy    3. Primary hypertension           Hyperthyroidism        Currently taking methimazole 5 mg once daily              She knows the side effects of methimazole        Component      Latest Ref Rng & Units 3/9/2021   Thyroid Peroxidase Antibody      0 - 34 IU/mL <9   Thyroid Stimulating Immunoglobulin      0.00 - 0.55 IU/L 0.19   Thyrotropin Receptor Antibody      0.00 - 1.75 IU/L 1.43            Lab Results   Component Value Date    TSH 3.910 04/12/2022           Hypertension      controlled on Maxide 1 daily             Bone health     Vitamin D deficiency     Taking a multivitamin daily     Component      Latest Ref Rng & Units 3/9/2021   25 Hydroxy, Vitamin D      30.0 - 100.0 ng/ml 21.6 (L)               Labs this week           Follow Up   No follow-ups on file.  Patient was given instructions and counseling regarding her condition or for health maintenance advice. Please see specific information pulled into the AVS if appropriate.         This document has been electronically signed by ROSSY Walton on January 27, 2023 09:34 CST.

## 2023-03-17 ENCOUNTER — LAB (OUTPATIENT)
Dept: LAB | Facility: HOSPITAL | Age: 43
End: 2023-03-17
Payer: COMMERCIAL

## 2023-03-17 LAB
ALBUMIN SERPL-MCNC: 3.9 G/DL (ref 3.5–5.2)
ALBUMIN/GLOB SERPL: 1.3 G/DL
ALP SERPL-CCNC: 109 U/L (ref 39–117)
ALT SERPL W P-5'-P-CCNC: 26 U/L (ref 1–33)
ANION GAP SERPL CALCULATED.3IONS-SCNC: 10 MMOL/L (ref 5–15)
AST SERPL-CCNC: 15 U/L (ref 1–32)
BASOPHILS # BLD AUTO: 0.06 10*3/MM3 (ref 0–0.2)
BASOPHILS NFR BLD AUTO: 1.1 % (ref 0–1.5)
BILIRUB SERPL-MCNC: 0.9 MG/DL (ref 0–1.2)
BUN SERPL-MCNC: 12 MG/DL (ref 6–20)
BUN/CREAT SERPL: 11.7 (ref 7–25)
CALCIUM SPEC-SCNC: 9.1 MG/DL (ref 8.6–10.5)
CHLORIDE SERPL-SCNC: 104 MMOL/L (ref 98–107)
CO2 SERPL-SCNC: 26 MMOL/L (ref 22–29)
CREAT SERPL-MCNC: 1.03 MG/DL (ref 0.57–1)
DEPRECATED RDW RBC AUTO: 41.7 FL (ref 37–54)
EGFRCR SERPLBLD CKD-EPI 2021: 68.9 ML/MIN/1.73
EOSINOPHIL # BLD AUTO: 0.21 10*3/MM3 (ref 0–0.4)
EOSINOPHIL NFR BLD AUTO: 3.9 % (ref 0.3–6.2)
ERYTHROCYTE [DISTWIDTH] IN BLOOD BY AUTOMATED COUNT: 13.1 % (ref 12.3–15.4)
GLOBULIN UR ELPH-MCNC: 2.9 GM/DL
GLUCOSE SERPL-MCNC: 367 MG/DL (ref 65–99)
HCT VFR BLD AUTO: 41.2 % (ref 34–46.6)
HGB BLD-MCNC: 13.4 G/DL (ref 12–15.9)
IMM GRANULOCYTES # BLD AUTO: 0.01 10*3/MM3 (ref 0–0.05)
IMM GRANULOCYTES NFR BLD AUTO: 0.2 % (ref 0–0.5)
LYMPHOCYTES # BLD AUTO: 2.36 10*3/MM3 (ref 0.7–3.1)
LYMPHOCYTES NFR BLD AUTO: 43.9 % (ref 19.6–45.3)
MCH RBC QN AUTO: 28.8 PG (ref 26.6–33)
MCHC RBC AUTO-ENTMCNC: 32.5 G/DL (ref 31.5–35.7)
MCV RBC AUTO: 88.4 FL (ref 79–97)
MONOCYTES # BLD AUTO: 0.39 10*3/MM3 (ref 0.1–0.9)
MONOCYTES NFR BLD AUTO: 7.2 % (ref 5–12)
NEUTROPHILS NFR BLD AUTO: 2.35 10*3/MM3 (ref 1.7–7)
NEUTROPHILS NFR BLD AUTO: 43.7 % (ref 42.7–76)
NRBC BLD AUTO-RTO: 0 /100 WBC (ref 0–0.2)
PLATELET # BLD AUTO: 153 10*3/MM3 (ref 140–450)
PMV BLD AUTO: 12.9 FL (ref 6–12)
POTASSIUM SERPL-SCNC: 4.3 MMOL/L (ref 3.5–5.2)
PROT SERPL-MCNC: 6.8 G/DL (ref 6–8.5)
RBC # BLD AUTO: 4.66 10*6/MM3 (ref 3.77–5.28)
SODIUM SERPL-SCNC: 140 MMOL/L (ref 136–145)
WBC NRBC COR # BLD: 5.38 10*3/MM3 (ref 3.4–10.8)

## 2023-03-17 PROCEDURE — 82306 VITAMIN D 25 HYDROXY: CPT | Performed by: NURSE PRACTITIONER

## 2023-03-17 PROCEDURE — 85025 COMPLETE CBC W/AUTO DIFF WBC: CPT | Performed by: NURSE PRACTITIONER

## 2023-03-17 PROCEDURE — 84443 ASSAY THYROID STIM HORMONE: CPT | Performed by: NURSE PRACTITIONER

## 2023-03-17 PROCEDURE — 84480 ASSAY TRIIODOTHYRONINE (T3): CPT | Performed by: NURSE PRACTITIONER

## 2023-03-17 PROCEDURE — 84439 ASSAY OF FREE THYROXINE: CPT | Performed by: NURSE PRACTITIONER

## 2023-03-17 PROCEDURE — 83036 HEMOGLOBIN GLYCOSYLATED A1C: CPT | Performed by: NURSE PRACTITIONER

## 2023-03-17 PROCEDURE — 80053 COMPREHEN METABOLIC PANEL: CPT | Performed by: NURSE PRACTITIONER

## 2023-03-18 DIAGNOSIS — E11.9 CONTROLLED TYPE 2 DIABETES MELLITUS WITHOUT COMPLICATION, WITHOUT LONG-TERM CURRENT USE OF INSULIN: Primary | ICD-10-CM

## 2023-03-18 LAB
25(OH)D3 SERPL-MCNC: 20.2 NG/ML (ref 30–100)
HBA1C MFR BLD: 15.2 % (ref 4.8–5.6)
T3 SERPL-MCNC: 96.2 NG/DL (ref 80–200)
T4 FREE SERPL-MCNC: 1.06 NG/DL (ref 0.93–1.7)
TSH SERPL DL<=0.05 MIU/L-ACNC: 1.04 UIU/ML (ref 0.27–4.2)

## 2023-03-20 ENCOUNTER — TELEPHONE (OUTPATIENT)
Dept: ENDOCRINOLOGY | Facility: CLINIC | Age: 43
End: 2023-03-20
Payer: COMMERCIAL

## 2023-03-20 DIAGNOSIS — E11.9 CONTROLLED TYPE 2 DIABETES MELLITUS WITHOUT COMPLICATION, WITHOUT LONG-TERM CURRENT USE OF INSULIN: Primary | ICD-10-CM

## 2023-03-20 NOTE — PROGRESS NOTES
Connie stated that she does not want to start on the insulin and requested that you rerun her labs after she has been on the metformin and her weekly injection for longer than a month

## 2023-03-20 NOTE — TELEPHONE ENCOUNTER
----- Message from ROSSY Walton sent at 3/19/2023  9:29 PM CDT -----  Regarding: FW:  Her A1c is 15 she needs insulin and appointment with Enrique to show her how to take and teach carb counting and self titration . ASAP appointment  Thryoid is normal, she is diabetic her bg was 367 at the lab, please get in immediately with Enrique we have to start medications    ----- Message -----  From: Lab, Background User  Sent: 3/18/2023   7:49 PM CDT  To: ROSSY Walton

## 2023-04-07 ENCOUNTER — OFFICE VISIT (OUTPATIENT)
Dept: ENDOCRINOLOGY | Facility: CLINIC | Age: 43
End: 2023-04-07
Payer: COMMERCIAL

## 2023-04-07 DIAGNOSIS — E11.65 TYPE 2 DIABETES MELLITUS WITH HYPERGLYCEMIA, WITHOUT LONG-TERM CURRENT USE OF INSULIN: ICD-10-CM

## 2023-04-07 PROCEDURE — G0108 DIAB MANAGE TRN  PER INDIV: HCPCS | Performed by: DIETITIAN, REGISTERED

## 2023-04-07 NOTE — PROGRESS NOTES
Connie Lopez is a 42 y.o. female referred for outpatient diabetes education by Adama BLAIR. Patient attended individual for 40 minutes education on 04/07/2023. Topics covered included:     1. Healthy Food Choices   i. Provided patient with detailed carbohydrate counting guide.    ii. Instructed patient to eat 45-60 grams of carbohydrate with each meal and 15 grams of carb (plus protein source) for snacks    iii. Provided patient with list of non-starchy vegetables and foods that are low in carbohydrate for snacks and to incorporate with meals (Planning Healthy Meals Packet).    iv. Choose fruits, vegetables, whole grains, legumes, low-fat milk, fiber-rich foods, minimal saturated fats, and watch cholesterol and sodium intake.    v. Reviewed carbohydrate-containing foods, standard serving sizes, and measuring foods.   vi. Reviewed the difference between simple and complex carbohydrate. Encouraged patient to choose complex carbohydrates more often.   vii. Reviewed label reading. Discussed looking at serving size, total carbohydrates, fiber, saturated fat, sodium. Reviewed  amounts of each to hav per day/meal.      2. Pathophysiology of Diabetes   i. Explained common conditions associated with uncontrolled diabetes (diabetic neuropathy, retinopathy, nephropathy, heart disease, skin infections, and kidney disease)     3. Hyperglycemia/Hypoglycemia   i. Discussed signs, symptoms, and difference between hypo/hyperglycemia - and the proper treatment guidelines for both.    ii. Explained A1C and the average blood sugar that goes along with the A1C level.    iii. Pt reports not currently checking BG; will send rx for Lele 2 (agreeable if covered by insurance). Pt reports stopping Metformin r/d nausea/diarrhea. Pt continues to take Ozempic 0.5 mg weekly, as well as new rx for Farxiga (unsure of dosage). Encouraged pt to get >30 minutes/day PA for better glycemic/weight control     Provided patient with  Diabetes and You book, and Planning Healthy Meals book. Provided handout of sample menu with carbs listed.      Thank you Adama BLAIR for this referral.      MIYA Daniels, CRHIS, LD

## 2023-04-24 ENCOUNTER — TELEPHONE (OUTPATIENT)
Dept: ENDOCRINOLOGY | Facility: CLINIC | Age: 43
End: 2023-04-24
Payer: COMMERCIAL

## 2023-04-27 ENCOUNTER — TELEPHONE (OUTPATIENT)
Dept: ENDOCRINOLOGY | Facility: CLINIC | Age: 43
End: 2023-04-27
Payer: COMMERCIAL

## 2023-04-27 NOTE — TELEPHONE ENCOUNTER
Called x2 to try and reschedule appt with Enrique. LVM and pt hasn't called back. Rescheduled appt and mailing reminder to pt.

## 2023-07-27 ENCOUNTER — TELEMEDICINE (OUTPATIENT)
Dept: ENDOCRINOLOGY | Facility: CLINIC | Age: 43
End: 2023-07-27
Payer: COMMERCIAL

## 2023-07-27 DIAGNOSIS — I10 ESSENTIAL HYPERTENSION: ICD-10-CM

## 2023-07-27 DIAGNOSIS — E55.9 VITAMIN D DEFICIENCY: ICD-10-CM

## 2023-07-27 DIAGNOSIS — E05.90 HYPERTHYROIDISM: Primary | Chronic | ICD-10-CM

## 2023-07-27 NOTE — PROGRESS NOTES
Chief Complaint  Thyroid Problem    Subjective          Connie Lopez presents to [facility] ENDOCRINOLOGY  History of Present Illness     You have chosen to receive care through a telehealth visit.  Do you consent to use a video/audio connection for your medical care today? Yes              TELEHEALTH VIDEO VISIT     This a video visit due to [facility] current guidelines for social distancing due to the COVID 19 pandemic      Mode of Visit: Video  Location of patient: home  You have chosen to receive care through a telehealth visit.  Does the patient consent to use a video/audio connection for your medical care today? Yes  The visit included audio and video interaction. No technical issues occurred during this visit.           42 year old female presents for follow up      Reason hypothyroidism     Duration since 2014            Timing constant     Severity is moderate          Location/size      Thyroid      No ultrasound        Current medications --- methimazole         alleviating factors -- compliance with medication         Review of Systems - General ROS: negative          Objective   Vital Signs:   There were no vitals taken for this visit.    Physical Exam  Neurological:      General: No focal deficit present.      Mental Status: She is alert.   Psychiatric:         Mood and Affect: Mood normal.         Thought Content: Thought content normal.         Judgment: Judgment normal.      Result Review :   The following data was reviewed by: ROSSY Walton on 04/12/2022:  Common labs          3/17/2023    08:37   Common Labs   Glucose 367    BUN 12    Creatinine 1.03    Sodium 140    Potassium 4.3    Chloride 104    Calcium 9.1    Albumin 3.9    Total Bilirubin 0.9    Alkaline Phosphatase 109    AST (SGOT) 15    ALT (SGPT) 26    WBC 5.38    Hemoglobin 13.4    Hematocrit 41.2    Platelets 153    Hemoglobin A1C 15.20              Assessment and Plan     Diagnoses and all orders for this visit:    1. Hyperthyroidism (Primary)  -     T4, Free; Future  -     TSH; Future  -     T3; Future  -     CBC & Differential; Future  -     Comprehensive Metabolic Panel; Future    2. Essential hypertension    3. Vitamin D deficiency             Hyperthyroidism        Currently taking methimazole 5 mg once daily              She knows the side effects of methimazole        Component      Latest Ref Rng & Units 3/9/2021   Thyroid Peroxidase Antibody      0 - 34 IU/mL <9   Thyroid Stimulating Immunoglobulin      0.00 - 0.55 IU/L 0.19   Thyrotropin Receptor Antibody      0.00 - 1.75 IU/L 1.43            Lab Results   Component Value Date    TSH 1.040 03/17/2023           Hypertension      controlled on Maxide 1 daily             Bone health     Vitamin D deficiency     Taking a multivitamin daily     Component      Latest Ref Rng & Units 3/9/2021   25 Hydroxy, Vitamin D      30.0 - 100.0 ng/ml 21.6 (L)               Labs this week           Follow Up   No follow-ups on file.  Patient was given instructions and counseling regarding her condition or for health maintenance advice. Please see specific information pulled into the AVS if appropriate.         This document has been electronically signed by ROSSY Walton on July 27, 2023 09:15 CDT.